# Patient Record
Sex: MALE | Race: BLACK OR AFRICAN AMERICAN | Employment: OTHER | URBAN - METROPOLITAN AREA
[De-identification: names, ages, dates, MRNs, and addresses within clinical notes are randomized per-mention and may not be internally consistent; named-entity substitution may affect disease eponyms.]

---

## 2017-05-25 ENCOUNTER — HOSPITAL ENCOUNTER (OUTPATIENT)
Age: 72
Discharge: HOME | End: 2017-05-25
Payer: MEDICARE

## 2017-05-25 LAB
ABSOLUTE EOS #: 0.1 K/UL (ref 0–0.4)
ABSOLUTE LYMPH #: 1.1 K/UL (ref 1–4.8)
ABSOLUTE MONO #: 0.6 K/UL (ref 0.1–1.2)
ALT SERPL-CCNC: 13 U/L (ref 5–41)
AST SERPL-CCNC: 14 U/L
BASOPHILS # BLD: 1 %
BASOPHILS ABSOLUTE: 0 K/UL (ref 0–0.2)
CHOLESTEROL/HDL RATIO: 2.9
CHOLESTEROL: 148 MG/DL
DIFFERENTIAL TYPE: ABNORMAL
EOSINOPHILS RELATIVE PERCENT: 4 %
HCT VFR BLD CALC: 33 % (ref 41–53)
HDLC SERPL-MCNC: 51 MG/DL
HEMOGLOBIN: 11.1 G/DL (ref 13.5–17.5)
LDL CHOLESTEROL: 74 MG/DL (ref 0–130)
LYMPHOCYTES # BLD: 27 %
MCH RBC QN AUTO: 27.4 PG (ref 26–34)
MCHC RBC AUTO-ENTMCNC: 33.7 G/DL (ref 31–37)
MCV RBC AUTO: 81.3 FL (ref 80–100)
MONOCYTES # BLD: 16 %
PDW BLD-RTO: 15.1 % (ref 12.5–15.4)
PLATELET # BLD: 167 K/UL (ref 140–450)
PLATELET ESTIMATE: ABNORMAL
PMV BLD AUTO: 9.7 FL (ref 6–12)
RBC # BLD: 4.06 M/UL (ref 4.5–5.9)
RBC # BLD: ABNORMAL 10*6/UL
SEG NEUTROPHILS: 52 %
SEGMENTED NEUTROPHILS ABSOLUTE COUNT: 2.2 K/UL (ref 1.8–7.7)
TRIGL SERPL-MCNC: 117 MG/DL
VLDLC SERPL CALC-MCNC: NORMAL MG/DL (ref 1–30)
WBC # BLD: 4.1 K/UL (ref 3.5–11)
WBC # BLD: ABNORMAL 10*3/UL

## 2017-05-25 PROCEDURE — 84460 ALANINE AMINO (ALT) (SGPT): CPT

## 2017-05-25 PROCEDURE — 84450 TRANSFERASE (AST) (SGOT): CPT

## 2017-05-25 PROCEDURE — 85025 COMPLETE CBC W/AUTO DIFF WBC: CPT

## 2017-05-25 PROCEDURE — 80061 LIPID PANEL: CPT

## 2017-11-16 ENCOUNTER — HOSPITAL ENCOUNTER (OUTPATIENT)
Age: 72
Discharge: HOME | End: 2017-11-16
Payer: MEDICARE

## 2017-11-16 LAB
ANION GAP SERPL CALCULATED.3IONS-SCNC: 13 MMOL/L (ref 9–17)
BUN BLDV-MCNC: 9 MG/DL (ref 8–23)
BUN/CREAT BLD: ABNORMAL (ref 9–20)
CALCIUM SERPL-MCNC: 9.6 MG/DL (ref 8.6–10.4)
CHLORIDE BLD-SCNC: 100 MMOL/L (ref 98–107)
CO2: 26 MMOL/L (ref 20–31)
CREAT SERPL-MCNC: 0.88 MG/DL (ref 0.7–1.2)
GFR AFRICAN AMERICAN: >60 ML/MIN
GFR NON-AFRICAN AMERICAN: >60 ML/MIN
GFR SERPL CREATININE-BSD FRML MDRD: ABNORMAL ML/MIN/{1.73_M2}
GFR SERPL CREATININE-BSD FRML MDRD: ABNORMAL ML/MIN/{1.73_M2}
GLUCOSE BLD-MCNC: 108 MG/DL (ref 70–99)
HCT VFR BLD CALC: 36.9 % (ref 40.7–50.3)
HEMOGLOBIN: 11.4 G/DL (ref 13–17)
INR BLD: 1
MCH RBC QN AUTO: 26.6 PG (ref 25.2–33.5)
MCHC RBC AUTO-ENTMCNC: 30.9 G/DL (ref 28.4–34.8)
MCV RBC AUTO: 86.2 FL (ref 82.6–102.9)
PDW BLD-RTO: 14.1 % (ref 11.8–14.4)
PLATELET # BLD: 174 K/UL (ref 138–453)
PMV BLD AUTO: 11.4 FL (ref 8.1–13.5)
POTASSIUM SERPL-SCNC: 4.6 MMOL/L (ref 3.7–5.3)
PROTHROMBIN TIME: 10.4 SEC (ref 9.4–12.6)
RBC # BLD: 4.28 M/UL (ref 4.21–5.77)
SODIUM BLD-SCNC: 139 MMOL/L (ref 135–144)
WBC # BLD: 4.7 K/UL (ref 3.5–11.3)

## 2017-11-16 PROCEDURE — 80048 BASIC METABOLIC PNL TOTAL CA: CPT

## 2017-11-16 PROCEDURE — 85027 COMPLETE CBC AUTOMATED: CPT

## 2017-11-16 PROCEDURE — 85610 PROTHROMBIN TIME: CPT

## 2017-11-16 PROCEDURE — 36415 COLL VENOUS BLD VENIPUNCTURE: CPT

## 2017-11-17 NOTE — H&P
Cardiovascular Office Follow up Note/ H and P. Date of office visit: 11/10/2017    Patient name: Jose De Jesus Treviño YOB: 1945    Reason for visit: Jose De Jesus Treviño is here for follow up of coronary artery disease, cardiomyopathy, hypertension and hyperlipidemia. Interval History:    Since his last visit, he has done well without any new cardiac complaints. He denies any lower extremity edema, orthopnea or paroxysmal nocturnal dyspnea. Coronary artery disease - history of coronary artery bypass graft surgery and stent placement. No new anginal episodes. Cardiomyopathy - Status post St. Bhanu automatic implantable cardiac defibrillator placement. His device check today showed his device to be in FRANCOIS. Hypertension - Blood pressure is controlled. Denies any headaches or dizziness. Tolerating medications. Hyperlipidemia - controlled on Atorvastatin. No new myalgia reported. Aortic valve disorder - Mild to moderate AI without any new heart failure symptoms. Overweight- No significant change in weight since last visit. Past medical history, Past surgical history, Family History, Social History, Medications, Allergies, Previous labs and Problem list were reviewed and updated as needed. Medications:     Current Outpatient Prescriptions:    amLODIPine (NORVASC) 5 mg tablet, Take 5 mg by mouth daily. , Disp: , Rfl:    aspirin 81 mg, Take 81 mg by mouth daily. , Disp: , Rfl:    atorvastatin (LIPITOR) 20 mg tablet, Take 20 mg by mouth daily. , Disp: , Rfl:    carvedilol (COREG) 25 mg tablet, Take 25 mg by mouth 2 (two) times a day with meals. , Disp: , Rfl:    chromium picolinate 200 mcg tablet, Take 200 mcg by mouth daily. , Disp: , Rfl:    clopidogrel (PLAVIX) 75 mg tablet, Take 75 mg by mouth daily. , Disp: , Rfl:    cranberry extract 200 mg capsule, Take by mouth daily. , Disp: , Rfl:    lisinopril (PRINIVIL,ZESTRIL) 40 mg tablet, Take 40 mg by mouth daily. , Disp: , Rfl:    (11/16/2017)   Narrative   Results from [facility].               Thank you very much for allowing us to participate in the care of this patient. Please call us with any questions.     Annette Hall MD

## 2017-11-20 RX ORDER — LISINOPRIL 40 MG/1
40 TABLET ORAL DAILY
COMMUNITY

## 2017-11-20 RX ORDER — MULTIVITAMIN/IRON/FOLIC ACID 18MG-0.4MG
250 TABLET ORAL DAILY
COMMUNITY

## 2017-11-20 RX ORDER — AMLODIPINE BESYLATE 5 MG/1
5 TABLET ORAL DAILY
COMMUNITY

## 2017-11-20 RX ORDER — CARVEDILOL 25 MG/1
25 TABLET ORAL 2 TIMES DAILY WITH MEALS
COMMUNITY

## 2017-11-20 RX ORDER — MULTIVIT-MIN/FOLIC/VIT K/LYCOP 400-300MCG
200 TABLET ORAL DAILY
COMMUNITY

## 2017-11-20 RX ORDER — MAGNESIUM GLUCONATE 30 MG(550)
595 TABLET ORAL DAILY
COMMUNITY

## 2017-11-20 RX ORDER — ATORVASTATIN CALCIUM 20 MG/1
20 TABLET, FILM COATED ORAL DAILY
COMMUNITY

## 2017-11-20 RX ORDER — CHROMIUM PICOLINATE 200 MCG
200 TABLET ORAL DAILY
COMMUNITY

## 2017-11-20 RX ORDER — CLOPIDOGREL BISULFATE 75 MG/1
75 TABLET ORAL DAILY
Status: ON HOLD | COMMUNITY
End: 2018-10-20

## 2017-11-20 RX ORDER — OMEPRAZOLE 20 MG/1
20 CAPSULE, DELAYED RELEASE ORAL DAILY
COMMUNITY

## 2017-11-21 ENCOUNTER — HOSPITAL ENCOUNTER (OUTPATIENT)
Dept: CARDIAC CATH/INVASIVE PROCEDURES | Age: 72
Discharge: HOME | End: 2017-11-21
Attending: INTERNAL MEDICINE | Admitting: INTERNAL MEDICINE
Payer: MEDICARE

## 2017-11-21 VITALS
HEART RATE: 71 BPM | HEIGHT: 68 IN | TEMPERATURE: 97 F | DIASTOLIC BLOOD PRESSURE: 55 MMHG | OXYGEN SATURATION: 98 % | WEIGHT: 180.5 LBS | SYSTOLIC BLOOD PRESSURE: 106 MMHG | BODY MASS INDEX: 27.35 KG/M2 | RESPIRATION RATE: 16 BRPM

## 2017-11-21 PROCEDURE — 7100000001 HC PACU RECOVERY - ADDTL 15 MIN

## 2017-11-21 PROCEDURE — C1777 LEAD, AICD, ENDO SINGLE COIL: HCPCS

## 2017-11-21 PROCEDURE — 7100000011 HC PHASE II RECOVERY - ADDTL 15 MIN

## 2017-11-21 PROCEDURE — 33262 RMVL& REPLC PULSE GEN 1 LEAD: CPT | Performed by: INTERNAL MEDICINE

## 2017-11-21 PROCEDURE — 2580000003 HC RX 258: Performed by: INTERNAL MEDICINE

## 2017-11-21 PROCEDURE — C1722 AICD, SINGLE CHAMBER: HCPCS

## 2017-11-21 PROCEDURE — 7100000000 HC PACU RECOVERY - FIRST 15 MIN

## 2017-11-21 PROCEDURE — 2500000003 HC RX 250 WO HCPCS

## 2017-11-21 PROCEDURE — 7100000010 HC PHASE II RECOVERY - FIRST 15 MIN

## 2017-11-21 PROCEDURE — 6360000002 HC RX W HCPCS

## 2017-11-21 RX ORDER — SODIUM CHLORIDE 9 MG/ML
INJECTION, SOLUTION INTRAVENOUS CONTINUOUS
Status: DISCONTINUED | OUTPATIENT
Start: 2017-11-21 | End: 2017-11-22 | Stop reason: HOSPADM

## 2017-11-21 RX ORDER — SODIUM CHLORIDE 0.9 % (FLUSH) 0.9 %
10 SYRINGE (ML) INJECTION EVERY 12 HOURS SCHEDULED
Status: DISCONTINUED | OUTPATIENT
Start: 2017-11-21 | End: 2017-11-22 | Stop reason: HOSPADM

## 2017-11-21 RX ORDER — ACETAMINOPHEN 325 MG/1
650 TABLET ORAL EVERY 4 HOURS PRN
Status: DISCONTINUED | OUTPATIENT
Start: 2017-11-21 | End: 2017-11-22 | Stop reason: HOSPADM

## 2017-11-21 RX ORDER — SODIUM CHLORIDE 0.9 % (FLUSH) 0.9 %
10 SYRINGE (ML) INJECTION PRN
Status: DISCONTINUED | OUTPATIENT
Start: 2017-11-21 | End: 2017-11-22 | Stop reason: HOSPADM

## 2017-11-21 RX ORDER — ONDANSETRON 2 MG/ML
4 INJECTION INTRAMUSCULAR; INTRAVENOUS EVERY 6 HOURS PRN
Status: DISCONTINUED | OUTPATIENT
Start: 2017-11-21 | End: 2017-11-22 | Stop reason: HOSPADM

## 2017-11-21 RX ADMIN — SODIUM CHLORIDE: 9 INJECTION, SOLUTION INTRAVENOUS at 07:25

## 2017-11-21 ASSESSMENT — PAIN SCALES - GENERAL
PAINLEVEL_OUTOF10: 3
PAINLEVEL_OUTOF10: 0
PAINLEVEL_OUTOF10: 3

## 2017-11-21 ASSESSMENT — PAIN DESCRIPTION - PAIN TYPE
TYPE: SURGICAL PAIN

## 2017-11-21 ASSESSMENT — PAIN DESCRIPTION - LOCATION
LOCATION: CHEST;INCISION

## 2017-11-21 ASSESSMENT — PAIN - FUNCTIONAL ASSESSMENT: PAIN_FUNCTIONAL_ASSESSMENT: 0-10

## 2018-10-11 ENCOUNTER — HOSPITAL ENCOUNTER (OUTPATIENT)
Age: 73
Discharge: HOME | End: 2018-10-11
Payer: MEDICARE

## 2018-10-11 LAB
ANION GAP SERPL CALCULATED.3IONS-SCNC: 8 MMOL/L (ref 9–17)
BUN BLDV-MCNC: 8 MG/DL (ref 8–23)
BUN/CREAT BLD: ABNORMAL (ref 9–20)
CALCIUM SERPL-MCNC: 9.4 MG/DL (ref 8.6–10.4)
CHLORIDE BLD-SCNC: 101 MMOL/L (ref 98–107)
CO2: 29 MMOL/L (ref 20–31)
CREAT SERPL-MCNC: 0.97 MG/DL (ref 0.7–1.2)
GFR AFRICAN AMERICAN: >60 ML/MIN
GFR NON-AFRICAN AMERICAN: >60 ML/MIN
GFR SERPL CREATININE-BSD FRML MDRD: ABNORMAL ML/MIN/{1.73_M2}
GFR SERPL CREATININE-BSD FRML MDRD: ABNORMAL ML/MIN/{1.73_M2}
GLUCOSE BLD-MCNC: 103 MG/DL (ref 70–99)
HCT VFR BLD CALC: 41.7 % (ref 40.7–50.3)
HEMOGLOBIN: 13.4 G/DL (ref 13–17)
MCH RBC QN AUTO: 27.4 PG (ref 25.2–33.5)
MCHC RBC AUTO-ENTMCNC: 32.1 G/DL (ref 28.4–34.8)
MCV RBC AUTO: 85.3 FL (ref 82.6–102.9)
NRBC AUTOMATED: 0 PER 100 WBC
PDW BLD-RTO: 14 % (ref 11.8–14.4)
PLATELET # BLD: 161 K/UL (ref 138–453)
PMV BLD AUTO: 11.1 FL (ref 8.1–13.5)
POTASSIUM SERPL-SCNC: 4.7 MMOL/L (ref 3.7–5.3)
RBC # BLD: 4.89 M/UL (ref 4.21–5.77)
SODIUM BLD-SCNC: 138 MMOL/L (ref 135–144)
WBC # BLD: 4.7 K/UL (ref 3.5–11.3)

## 2018-10-11 PROCEDURE — 36415 COLL VENOUS BLD VENIPUNCTURE: CPT

## 2018-10-11 PROCEDURE — 80048 BASIC METABOLIC PNL TOTAL CA: CPT

## 2018-10-11 PROCEDURE — 85027 COMPLETE CBC AUTOMATED: CPT

## 2018-10-19 ENCOUNTER — HOSPITAL ENCOUNTER (INPATIENT)
Dept: CARDIAC CATH/INVASIVE PROCEDURES | Age: 73
LOS: 1 days | Discharge: HOME | DRG: 265 | End: 2018-10-20
Attending: INTERNAL MEDICINE | Admitting: INTERNAL MEDICINE
Payer: MEDICARE

## 2018-10-19 ENCOUNTER — ANESTHESIA (OUTPATIENT)
Dept: CARDIAC CATH/INVASIVE PROCEDURES | Age: 73
End: 2018-10-19

## 2018-10-19 ENCOUNTER — ANESTHESIA EVENT (OUTPATIENT)
Dept: CARDIAC CATH/INVASIVE PROCEDURES | Age: 73
End: 2018-10-19

## 2018-10-19 VITALS — OXYGEN SATURATION: 100 % | SYSTOLIC BLOOD PRESSURE: 102 MMHG | DIASTOLIC BLOOD PRESSURE: 58 MMHG | TEMPERATURE: 97.3 F

## 2018-10-19 DIAGNOSIS — T82.110A FAILURE OF PACEMAKER LEAD, INITIAL ENCOUNTER: ICD-10-CM

## 2018-10-19 LAB
ABO/RH: NORMAL
ANTIBODY SCREEN: NEGATIVE
ARM BAND NUMBER: NORMAL
EXPIRATION DATE: NORMAL

## 2018-10-19 PROCEDURE — 0JWT0PZ REVISION OF CARDIAC RHYTHM RELATED DEVICE IN TRUNK SUBCUTANEOUS TISSUE AND FASCIA, OPEN APPROACH: ICD-10-PCS | Performed by: INTERNAL MEDICINE

## 2018-10-19 PROCEDURE — 86901 BLOOD TYPING SEROLOGIC RH(D): CPT

## 2018-10-19 PROCEDURE — 6370000000 HC RX 637 (ALT 250 FOR IP): Performed by: INTERNAL MEDICINE

## 2018-10-19 PROCEDURE — 33216 INSERT 1 ELECTRODE PM-DEFIB: CPT | Performed by: INTERNAL MEDICINE

## 2018-10-19 PROCEDURE — 02HK3KZ INSERTION OF DEFIBRILLATOR LEAD INTO RIGHT VENTRICLE, PERCUTANEOUS APPROACH: ICD-10-PCS | Performed by: INTERNAL MEDICINE

## 2018-10-19 PROCEDURE — 2720000010 HC SURG SUPPLY STERILE

## 2018-10-19 PROCEDURE — 6360000002 HC RX W HCPCS: Performed by: NURSE ANESTHETIST, CERTIFIED REGISTERED

## 2018-10-19 PROCEDURE — G0378 HOSPITAL OBSERVATION PER HR: HCPCS

## 2018-10-19 PROCEDURE — 6360000002 HC RX W HCPCS

## 2018-10-19 PROCEDURE — 2700000000 HC OXYGEN THERAPY PER DAY

## 2018-10-19 PROCEDURE — 2580000003 HC RX 258: Performed by: INTERNAL MEDICINE

## 2018-10-19 PROCEDURE — 94762 N-INVAS EAR/PLS OXIMTRY CONT: CPT

## 2018-10-19 PROCEDURE — 2060000000 HC ICU INTERMEDIATE R&B

## 2018-10-19 PROCEDURE — 2580000003 HC RX 258: Performed by: NURSE ANESTHETIST, CERTIFIED REGISTERED

## 2018-10-19 PROCEDURE — 2500000003 HC RX 250 WO HCPCS: Performed by: INTERNAL MEDICINE

## 2018-10-19 PROCEDURE — 3700000001 HC ADD 15 MINUTES (ANESTHESIA)

## 2018-10-19 PROCEDURE — C1894 INTRO/SHEATH, NON-LASER: HCPCS

## 2018-10-19 PROCEDURE — 86850 RBC ANTIBODY SCREEN: CPT

## 2018-10-19 PROCEDURE — 2709999900 HC NON-CHARGEABLE SUPPLY

## 2018-10-19 PROCEDURE — 6360000002 HC RX W HCPCS: Performed by: INTERNAL MEDICINE

## 2018-10-19 PROCEDURE — 3700000000 HC ANESTHESIA ATTENDED CARE

## 2018-10-19 PROCEDURE — 33244 REMOVE ELCTRD TRANSVENOUSLY: CPT | Performed by: INTERNAL MEDICINE

## 2018-10-19 PROCEDURE — 6360000002 HC RX W HCPCS: Performed by: ANESTHESIOLOGY

## 2018-10-19 PROCEDURE — 02PA3MZ REMOVAL OF CARDIAC LEAD FROM HEART, PERCUTANEOUS APPROACH: ICD-10-PCS | Performed by: INTERNAL MEDICINE

## 2018-10-19 PROCEDURE — 2500000003 HC RX 250 WO HCPCS: Performed by: NURSE ANESTHETIST, CERTIFIED REGISTERED

## 2018-10-19 PROCEDURE — 86900 BLOOD TYPING SEROLOGIC ABO: CPT

## 2018-10-19 PROCEDURE — 6360000004 HC RX CONTRAST MEDICATION

## 2018-10-19 PROCEDURE — C1769 GUIDE WIRE: HCPCS

## 2018-10-19 RX ORDER — SODIUM CHLORIDE, SODIUM LACTATE, POTASSIUM CHLORIDE, CALCIUM CHLORIDE 600; 310; 30; 20 MG/100ML; MG/100ML; MG/100ML; MG/100ML
INJECTION, SOLUTION INTRAVENOUS CONTINUOUS PRN
Status: DISCONTINUED | OUTPATIENT
Start: 2018-10-19 | End: 2018-10-19 | Stop reason: SDUPTHER

## 2018-10-19 RX ORDER — PROPOFOL 10 MG/ML
INJECTION, EMULSION INTRAVENOUS PRN
Status: DISCONTINUED | OUTPATIENT
Start: 2018-10-19 | End: 2018-10-19 | Stop reason: SDUPTHER

## 2018-10-19 RX ORDER — LISINOPRIL 20 MG/1
40 TABLET ORAL DAILY
Status: DISCONTINUED | OUTPATIENT
Start: 2018-10-19 | End: 2018-10-20 | Stop reason: HOSPADM

## 2018-10-19 RX ORDER — GLYCOPYRROLATE 1 MG/5 ML
SYRINGE (ML) INTRAVENOUS PRN
Status: DISCONTINUED | OUTPATIENT
Start: 2018-10-19 | End: 2018-10-19 | Stop reason: SDUPTHER

## 2018-10-19 RX ORDER — LABETALOL HYDROCHLORIDE 5 MG/ML
5 INJECTION, SOLUTION INTRAVENOUS EVERY 6 HOURS PRN
Status: DISCONTINUED | OUTPATIENT
Start: 2018-10-19 | End: 2018-10-20 | Stop reason: HOSPADM

## 2018-10-19 RX ORDER — ACETAMINOPHEN 325 MG/1
650 TABLET ORAL EVERY 4 HOURS PRN
Status: DISCONTINUED | OUTPATIENT
Start: 2018-10-19 | End: 2018-10-20 | Stop reason: HOSPADM

## 2018-10-19 RX ORDER — CEFAZOLIN SODIUM 1 G/50ML
1 INJECTION, SOLUTION INTRAVENOUS EVERY 8 HOURS
Status: DISCONTINUED | OUTPATIENT
Start: 2018-10-19 | End: 2018-10-20 | Stop reason: HOSPADM

## 2018-10-19 RX ORDER — ONDANSETRON 2 MG/ML
4 INJECTION INTRAMUSCULAR; INTRAVENOUS EVERY 6 HOURS PRN
Status: DISCONTINUED | OUTPATIENT
Start: 2018-10-19 | End: 2018-10-20 | Stop reason: HOSPADM

## 2018-10-19 RX ORDER — SODIUM CHLORIDE 0.9 % (FLUSH) 0.9 %
10 SYRINGE (ML) INJECTION EVERY 12 HOURS SCHEDULED
Status: DISCONTINUED | OUTPATIENT
Start: 2018-10-19 | End: 2018-10-20 | Stop reason: HOSPADM

## 2018-10-19 RX ORDER — AMLODIPINE BESYLATE 5 MG/1
5 TABLET ORAL DAILY
Status: DISCONTINUED | OUTPATIENT
Start: 2018-10-19 | End: 2018-10-20 | Stop reason: HOSPADM

## 2018-10-19 RX ORDER — LABETALOL HYDROCHLORIDE 5 MG/ML
15 INJECTION, SOLUTION INTRAVENOUS ONCE
Status: COMPLETED | OUTPATIENT
Start: 2018-10-19 | End: 2018-10-19

## 2018-10-19 RX ORDER — ONDANSETRON 2 MG/ML
INJECTION INTRAMUSCULAR; INTRAVENOUS PRN
Status: DISCONTINUED | OUTPATIENT
Start: 2018-10-19 | End: 2018-10-19 | Stop reason: SDUPTHER

## 2018-10-19 RX ORDER — SODIUM CHLORIDE 9 MG/ML
INJECTION, SOLUTION INTRAVENOUS CONTINUOUS
Status: DISCONTINUED | OUTPATIENT
Start: 2018-10-19 | End: 2018-10-20 | Stop reason: HOSPADM

## 2018-10-19 RX ORDER — LIDOCAINE HYDROCHLORIDE 10 MG/ML
INJECTION, SOLUTION EPIDURAL; INFILTRATION; INTRACAUDAL; PERINEURAL PRN
Status: DISCONTINUED | OUTPATIENT
Start: 2018-10-19 | End: 2018-10-19 | Stop reason: SDUPTHER

## 2018-10-19 RX ORDER — FENTANYL CITRATE 50 UG/ML
INJECTION, SOLUTION INTRAMUSCULAR; INTRAVENOUS PRN
Status: DISCONTINUED | OUTPATIENT
Start: 2018-10-19 | End: 2018-10-19 | Stop reason: SDUPTHER

## 2018-10-19 RX ORDER — ROCURONIUM BROMIDE 10 MG/ML
INJECTION, SOLUTION INTRAVENOUS PRN
Status: DISCONTINUED | OUTPATIENT
Start: 2018-10-19 | End: 2018-10-19 | Stop reason: SDUPTHER

## 2018-10-19 RX ORDER — CARVEDILOL 25 MG/1
25 TABLET ORAL 2 TIMES DAILY WITH MEALS
Status: DISCONTINUED | OUTPATIENT
Start: 2018-10-19 | End: 2018-10-20 | Stop reason: HOSPADM

## 2018-10-19 RX ORDER — DEXAMETHASONE SODIUM PHOSPHATE 10 MG/ML
INJECTION INTRAMUSCULAR; INTRAVENOUS PRN
Status: DISCONTINUED | OUTPATIENT
Start: 2018-10-19 | End: 2018-10-19 | Stop reason: SDUPTHER

## 2018-10-19 RX ORDER — CEFAZOLIN SODIUM 1 G/50ML
1 INJECTION, SOLUTION INTRAVENOUS ONCE
Status: COMPLETED | OUTPATIENT
Start: 2018-10-19 | End: 2018-10-19

## 2018-10-19 RX ORDER — SODIUM CHLORIDE 0.9 % (FLUSH) 0.9 %
10 SYRINGE (ML) INJECTION PRN
Status: DISCONTINUED | OUTPATIENT
Start: 2018-10-19 | End: 2018-10-20 | Stop reason: HOSPADM

## 2018-10-19 RX ORDER — FENTANYL CITRATE 50 UG/ML
25 INJECTION, SOLUTION INTRAMUSCULAR; INTRAVENOUS EVERY 5 MIN PRN
Status: DISCONTINUED | OUTPATIENT
Start: 2018-10-19 | End: 2018-10-20 | Stop reason: HOSPADM

## 2018-10-19 RX ORDER — ASPIRIN 81 MG/1
81 TABLET ORAL DAILY
Status: DISCONTINUED | OUTPATIENT
Start: 2018-10-19 | End: 2018-10-20 | Stop reason: HOSPADM

## 2018-10-19 RX ORDER — MEPERIDINE HYDROCHLORIDE 50 MG/ML
12.5 INJECTION INTRAMUSCULAR; INTRAVENOUS; SUBCUTANEOUS EVERY 5 MIN PRN
Status: DISCONTINUED | OUTPATIENT
Start: 2018-10-19 | End: 2018-10-20 | Stop reason: HOSPADM

## 2018-10-19 RX ADMIN — LISINOPRIL 40 MG: 20 TABLET ORAL at 15:58

## 2018-10-19 RX ADMIN — Medication 0.2 MG: at 09:30

## 2018-10-19 RX ADMIN — FENTANYL CITRATE 50 MCG: 50 INJECTION INTRAMUSCULAR; INTRAVENOUS at 09:06

## 2018-10-19 RX ADMIN — PROPOFOL 30 MG: 10 INJECTION, EMULSION INTRAVENOUS at 08:54

## 2018-10-19 RX ADMIN — SODIUM CHLORIDE: 9 INJECTION, SOLUTION INTRAVENOUS at 07:02

## 2018-10-19 RX ADMIN — ROCURONIUM BROMIDE 20 MG: 10 INJECTION INTRAVENOUS at 09:02

## 2018-10-19 RX ADMIN — SODIUM CHLORIDE, POTASSIUM CHLORIDE, SODIUM LACTATE AND CALCIUM CHLORIDE: 600; 310; 30; 20 INJECTION, SOLUTION INTRAVENOUS at 08:06

## 2018-10-19 RX ADMIN — LABETALOL HYDROCHLORIDE 15 MG: 5 INJECTION, SOLUTION INTRAVENOUS at 15:59

## 2018-10-19 RX ADMIN — CARVEDILOL 25 MG: 25 TABLET, FILM COATED ORAL at 15:58

## 2018-10-19 RX ADMIN — Medication 10 ML: at 19:40

## 2018-10-19 RX ADMIN — ONDANSETRON 4 MG: 2 INJECTION, SOLUTION INTRAMUSCULAR; INTRAVENOUS at 11:50

## 2018-10-19 RX ADMIN — CEFAZOLIN SODIUM 2 G: 1 INJECTION, SOLUTION INTRAVENOUS at 08:09

## 2018-10-19 RX ADMIN — Medication 0.2 MG: at 10:31

## 2018-10-19 RX ADMIN — ROCURONIUM BROMIDE 50 MG: 10 INJECTION INTRAVENOUS at 07:52

## 2018-10-19 RX ADMIN — FENTANYL CITRATE 25 MCG: 50 INJECTION, SOLUTION INTRAMUSCULAR; INTRAVENOUS at 12:50

## 2018-10-19 RX ADMIN — FENTANYL CITRATE 100 MCG: 50 INJECTION INTRAMUSCULAR; INTRAVENOUS at 07:52

## 2018-10-19 RX ADMIN — ASPIRIN 81 MG: 81 TABLET ORAL at 15:58

## 2018-10-19 RX ADMIN — DEXAMETHASONE SODIUM PHOSPHATE 10 MG: 10 INJECTION INTRAMUSCULAR; INTRAVENOUS at 08:10

## 2018-10-19 RX ADMIN — NEOSTIGMINE METHYLSULFATE 3 MG: 1 INJECTION, SOLUTION INTRAMUSCULAR; INTRAVENOUS; SUBCUTANEOUS at 11:46

## 2018-10-19 RX ADMIN — PHENYLEPHRINE HYDROCHLORIDE 100 MCG: 10 INJECTION INTRAVENOUS at 10:29

## 2018-10-19 RX ADMIN — PROPOFOL 150 MG: 10 INJECTION, EMULSION INTRAVENOUS at 07:52

## 2018-10-19 RX ADMIN — FENTANYL CITRATE 50 MCG: 50 INJECTION INTRAMUSCULAR; INTRAVENOUS at 08:54

## 2018-10-19 RX ADMIN — Medication 0.4 MG: at 11:46

## 2018-10-19 RX ADMIN — LIDOCAINE HYDROCHLORIDE 50 MG: 10 INJECTION, SOLUTION EPIDURAL; INFILTRATION; INTRACAUDAL at 07:52

## 2018-10-19 RX ADMIN — CEFAZOLIN SODIUM 1 G: 1 INJECTION, SOLUTION INTRAVENOUS at 19:40

## 2018-10-19 ASSESSMENT — PAIN SCALES - GENERAL
PAINLEVEL_OUTOF10: 4
PAINLEVEL_OUTOF10: 4
PAINLEVEL_OUTOF10: 8

## 2018-10-19 ASSESSMENT — PAIN DESCRIPTION - PAIN TYPE: TYPE: ACUTE PAIN

## 2018-10-19 ASSESSMENT — PAIN DESCRIPTION - LOCATION: LOCATION: INCISION

## 2018-10-19 NOTE — CARE COORDINATION
Case Management Initial Discharge Plan  Fortino Chavez,             Met with:patient to discuss discharge plans. Information verified: address, contacts, phone number, , insurance Yes  PCP: Sameera Ibanez  Date of last visit:     Insurance Provider: Abram Owusu    Discharge Planning    Living Arrangements:  Alone   Support Systems:  Family Members, Friends/Neighbors    Home has 2 stories  5 stairs to climb to get into front door, 1 flight stairs to climb to reach second floor  Location of bedroom/bathroom in home second    Patient able to perform ADL's:Independent    Current Services (outpatient & in home) none  DME equipment: none  DME provider: none    Pharmacy: [facility] [address] Medications:  No  Does patient want to participate in local refill/ meds to beds program?  No    Potential Assistance Needed:  N/A    Patient agreeable to home care: No  Avery of choice provided:  no    Prior SNF/Rehab Placement and Facility: no  Agreeable to SNF/Rehab: No  Avery of choice provided: no   Evaluation: no    Expected Discharge date:     Patient expects to be discharged to:  home  Follow Up Appointment: Best Day/ Time:      Transportation provider: friend  Transportation arrangements needed for discharge: No    Readmission Risk              Risk of Unplanned Readmission:        7               Does patient have a readmission risk score greater than 14?: No  If yes, follow-up appointment must be made within 7 days of discharge.      Discharge Plan: home independently          Electronically signed by Walter Peterson RN on 10/19/18 at 2:48 PM

## 2018-10-20 ENCOUNTER — APPOINTMENT (OUTPATIENT)
Dept: GENERAL RADIOLOGY | Age: 73
DRG: 265 | End: 2018-10-20
Attending: INTERNAL MEDICINE
Payer: MEDICARE

## 2018-10-20 VITALS
BODY MASS INDEX: 26.2 KG/M2 | RESPIRATION RATE: 17 BRPM | DIASTOLIC BLOOD PRESSURE: 58 MMHG | WEIGHT: 172.84 LBS | SYSTOLIC BLOOD PRESSURE: 119 MMHG | TEMPERATURE: 98.4 F | HEIGHT: 68 IN | HEART RATE: 87 BPM | OXYGEN SATURATION: 97 %

## 2018-10-20 PROBLEM — T82.198A MALFUNCTION OF IMPLANTABLE DEFIBRILLATOR VENTRICULAR (ICD) LEAD: Status: ACTIVE | Noted: 2018-10-20

## 2018-10-20 PROCEDURE — 6360000002 HC RX W HCPCS: Performed by: INTERNAL MEDICINE

## 2018-10-20 PROCEDURE — G0378 HOSPITAL OBSERVATION PER HR: HCPCS

## 2018-10-20 PROCEDURE — 6370000000 HC RX 637 (ALT 250 FOR IP): Performed by: INTERNAL MEDICINE

## 2018-10-20 PROCEDURE — 71046 X-RAY EXAM CHEST 2 VIEWS: CPT

## 2018-10-20 RX ORDER — CEPHALEXIN 500 MG/1
500 CAPSULE ORAL 3 TIMES DAILY
Qty: 21 CAPSULE | Refills: 0 | Status: SHIPPED | OUTPATIENT
Start: 2018-10-20 | End: 2018-10-27

## 2018-10-20 RX ORDER — CLOPIDOGREL BISULFATE 75 MG/1
75 TABLET ORAL DAILY
Qty: 30 TABLET | Refills: 3 | Status: SHIPPED | OUTPATIENT
Start: 2018-10-27

## 2018-10-20 RX ADMIN — LISINOPRIL 40 MG: 20 TABLET ORAL at 08:32

## 2018-10-20 RX ADMIN — CEFAZOLIN SODIUM 1 G: 1 INJECTION, SOLUTION INTRAVENOUS at 03:39

## 2018-10-20 RX ADMIN — ACETAMINOPHEN 650 MG: 325 TABLET ORAL at 03:50

## 2018-10-20 RX ADMIN — CEFAZOLIN SODIUM 1 G: 1 INJECTION, SOLUTION INTRAVENOUS at 11:33

## 2018-10-20 RX ADMIN — CARVEDILOL 25 MG: 25 TABLET, FILM COATED ORAL at 08:32

## 2018-10-20 RX ADMIN — ASPIRIN 81 MG: 81 TABLET ORAL at 08:32

## 2018-10-20 RX ADMIN — AMLODIPINE BESYLATE 5 MG: 5 TABLET ORAL at 08:32

## 2018-10-20 ASSESSMENT — PAIN SCALES - GENERAL
PAINLEVEL_OUTOF10: 3
PAINLEVEL_OUTOF10: 5

## 2018-10-20 ASSESSMENT — PAIN DESCRIPTION - ORIENTATION: ORIENTATION: LEFT

## 2018-10-20 ASSESSMENT — PAIN DESCRIPTION - ONSET: ONSET: GRADUAL

## 2018-10-20 ASSESSMENT — PAIN DESCRIPTION - PROGRESSION: CLINICAL_PROGRESSION: NOT CHANGED

## 2018-10-20 ASSESSMENT — PAIN DESCRIPTION - PAIN TYPE: TYPE: SURGICAL PAIN

## 2018-10-20 ASSESSMENT — PAIN DESCRIPTION - DESCRIPTORS: DESCRIPTORS: SORE

## 2018-10-20 ASSESSMENT — PAIN DESCRIPTION - LOCATION: LOCATION: INCISION

## 2018-10-20 ASSESSMENT — PAIN DESCRIPTION - FREQUENCY: FREQUENCY: INTERMITTENT

## 2018-10-20 NOTE — PLAN OF CARE
Problem: Infection:  Goal: Will remain free from infection  Will remain free from infection  Outcome: Ongoing  Patient remains afebrile, surgical sites noted and no signs of infection.     Problem: Safety:  Goal: Free from accidental physical injury  Free from accidental physical injury  Outcome: Ongoing  Bed lock and in lowest position, side rails up x 2, room free from clutter, call light within reach, patient alert and oriented x 4 and remains free from falls    Problem: Pain:  Goal: Patient's pain/discomfort is manageable  Patient's pain/discomfort is manageable  Outcome: Ongoing  Patient denies pain when ask    Problem: Skin Integrity:  Goal: Skin integrity will stabilize  Skin integrity will stabilize  Outcome: Ongoing      Problem: Discharge Planning:  Goal: Patients continuum of care needs are met  Patients continuum of care needs are met  Outcome: Ongoing

## 2018-10-20 NOTE — DISCHARGE INSTR - ACTIVITY
Wash incisions with mild soap and water. Do not rub incisions. Do not put any lotions, ointments or powders on incisions. Do not soak incisions. No lifting more than 10 pounds. Continue to use incentive spirometer hourly for 2 weeks. Do not drive until physician follow up. Weigh yourself daily at the same time, and if you have a weight gain of 3 pounds in 24 hours or 5 pounds in one week call your physician. Call physician if temperature greater than 100.5 for more than 3 days. Call physician for any increase in redness or drainage of incisions.

## 2018-10-21 NOTE — DISCHARGE SUMMARY
[facility] [place]                              DISCHARGE SUMMARY    PATIENT NAME: Oscar Pascual                    :         1945  MED REC NO:   [id number]                             ROOM:       1006  ACCOUNT NO:   [de-identified]                           ADMIT DATE:  10/19/2018  PROVIDER:     Loreto Angela                  [facility] DATE:    ADMISSION DIAGNOSIS:  ICD lead malfunction. The patient had St. Bhanu  Riata lead and a nonfunctional atrial lead. DISCHARGE DIAGNOSES:  Successful laser lead extraction of right  ventricular defibrillating lead, successful laser lead extraction of  the right atrial pacing lead and successful implantation of a new  right ventricular defibrillating lead. HOSPITAL COURSE:  The patient is a 40-year-old male, who was admitted  electively for laser lead extraction of nonfunctioning ICD leads. The  patient had a St. Bhanu Riata lead with electrical interference and a  previously implanted right atrial lead, which was nonfunctional.  Note  that his initial implant occurred in . He had defibrillator which  was implanted in  for ischemic dilated cardiomyopathy. The  patient was referred for laser lead extraction of the 2 leads as  mentioned above and implantation of a new right ventricular  defibrillating lead. The patient underwent successful laser lead extraction of 2 leads on  10/19/2018. Please refer to my operative note for details. His postoperative course was uneventful. He was hemodynamically  stable. On the day of exam, the patient was hemodynamically stable. His vascular access sites were intact. Chest x-ray demonstrated no  evidence of pneumothorax. ICD check demonstrated excellent pacing and  sensing thresholds. He was discharged to home on 10/20/2018 with instructions to follow up  on an outpatient basis.   Please refer to discharge

## 2019-02-19 ENCOUNTER — HOSPITAL ENCOUNTER (OUTPATIENT)
Age: 74
Discharge: HOME | End: 2019-02-19
Payer: MEDICARE

## 2019-02-19 LAB
ALT SERPL-CCNC: 11 U/L (ref 5–41)
AST SERPL-CCNC: 13 U/L
CHOLESTEROL, FASTING: 116 MG/DL
CHOLESTEROL/HDL RATIO: 2.8
HDLC SERPL-MCNC: 42 MG/DL
LDL CHOLESTEROL: 59 MG/DL (ref 0–130)
TRIGLYCERIDE, FASTING: 73 MG/DL
VLDLC SERPL CALC-MCNC: NORMAL MG/DL (ref 1–30)

## 2019-02-19 PROCEDURE — 84450 TRANSFERASE (AST) (SGOT): CPT

## 2019-02-19 PROCEDURE — 80061 LIPID PANEL: CPT

## 2019-02-19 PROCEDURE — 84460 ALANINE AMINO (ALT) (SGPT): CPT

## 2019-02-19 PROCEDURE — 36415 COLL VENOUS BLD VENIPUNCTURE: CPT

## 2021-09-23 ENCOUNTER — HOSPITAL ENCOUNTER (OUTPATIENT)
Age: 76
Discharge: HOME | End: 2021-09-23
Payer: MEDICARE

## 2021-09-23 LAB
ANION GAP SERPL CALCULATED.3IONS-SCNC: 12 MMOL/L (ref 9–17)
BUN BLDV-MCNC: 9 MG/DL (ref 8–23)
BUN/CREAT BLD: ABNORMAL (ref 9–20)
CALCIUM SERPL-MCNC: 9.5 MG/DL (ref 8.6–10.4)
CHLORIDE BLD-SCNC: 105 MMOL/L (ref 98–107)
CO2: 25 MMOL/L (ref 20–31)
CREAT SERPL-MCNC: 0.97 MG/DL (ref 0.7–1.2)
GFR AFRICAN AMERICAN: >60 ML/MIN
GFR NON-AFRICAN AMERICAN: >60 ML/MIN
GFR SERPL CREATININE-BSD FRML MDRD: ABNORMAL ML/MIN/{1.73_M2}
GFR SERPL CREATININE-BSD FRML MDRD: ABNORMAL ML/MIN/{1.73_M2}
GLUCOSE BLD-MCNC: 116 MG/DL (ref 70–99)
POTASSIUM SERPL-SCNC: 4.3 MMOL/L (ref 3.7–5.3)
SODIUM BLD-SCNC: 142 MMOL/L (ref 135–144)

## 2021-09-23 PROCEDURE — 83036 HEMOGLOBIN GLYCOSYLATED A1C: CPT

## 2021-09-23 PROCEDURE — 80048 BASIC METABOLIC PNL TOTAL CA: CPT

## 2021-09-23 PROCEDURE — 36415 COLL VENOUS BLD VENIPUNCTURE: CPT

## 2021-09-24 LAB
ESTIMATED AVERAGE GLUCOSE: 128 MG/DL
HBA1C MFR BLD: 6.1 % (ref 4–6)

## 2022-11-01 ENCOUNTER — HOSPITAL ENCOUNTER (OUTPATIENT)
Dept: PHYSICAL THERAPY | Age: 77
Setting detail: THERAPIES SERIES
Discharge: HOME | End: 2022-11-01
Payer: MEDICARE

## 2022-11-01 PROCEDURE — 97162 PT EVAL MOD COMPLEX 30 MIN: CPT

## 2022-11-01 PROCEDURE — 97110 THERAPEUTIC EXERCISES: CPT

## 2022-11-01 NOTE — CONSULTS
[x] [facility]) [facility] &  Therapy  [address]  [phone]  F: [phone]         Physical Therapy Lower Extremity Evaluation    Date:  2022  Patient: John Mo  : 1945  MRN: [id number]  Physician: [facility]      Insurance: Sabi Nova (medical necessity)  Medical Diagnosis: Primary OA R Knee M17.11    Rehab Codes: M25.561, M25.661, R26.89, R26.81, M79.661, M25.561, M62.551, M62.551  Onset date: 10/06/2022  Next Dr's appt.: unknown     Subjective:   HPI/CC: Pt reports last month OOT getting out of limousine, was awkward, put a lot of pressure on his knees. Next day R knee gave out, went in toward other knee, w/pain. Pt went to Urgent care, Xrays negative, was issued brace, crutches, and pain medicine. Pt saw ortho, Xrays showed arthritis under knee cap, medial knee, reports Dr said soft tissue/meniscus is damaged, was given prescription for tylenol. Pain is decreasing, every day feels a little better. Biggest problem is descending steps, has to use cane. Pain increases w/steps, up to 10/10. Pt also complains of pain shooting down ant calf to ankle. Pt does not notice improvement w/hot water from bathing, has not used ice since went to ED. Pt [facility] to [facility] in wheelchair, wearing brace R knee, at eval, Velcro w/patella cutout and metal stays/hinges, reports brace helps when walking. Pt has been using cane at home for amb, unable to use crutches, unable to amb long distances.         PMHx: [] Unremarkable [] Diabetes [x] HTN  [] Pacemaker   [x] MI/Heart Problems-CABG  2017, Defib Replaced 10/19/2018-no motrin   [] Cancer [] Arthritis   [x] Other:chronic R shoulder pain, glaucoma, Hypertensive retinopathy , cataract, GERD    Past Medical History:   Diagnosis Date    Aortic valve disorder     CAD (coronary artery disease)     Cardiomyopathy ([address]     Hyperlipidemia     Hypertension     ICD (implantable cardioverter-defibrillator) battery depletion      Past Surgical History:   Procedure Laterality Date    CARDIAC DEFIBRILLATOR PLACEMENT  11/21/2017    replacement    CARDIAC SURGERY      CORONARY ANGIOPLASTY WITH STENT PLACEMENT      PROSTATECTOMY                   [x] Refer to full medical chart  In EPIC       Comorbidities:   [x] Obesity [] Dialysis  [] N/A   [] Asthma/COPD [] Dementia [] Other:   [] Stroke [] Sleep apnea [] Other:   [] Vascular disease [] Rheumatic disease [] Other:     Tests: [x] X-Ray:Per [facility] note moderate DJD greater at patellofemoral joint. [] MRI:  [] Other:    Medications: [x] Refer to full medical record [] None [] Other:  Allergies:      [x] Refer to full medical record [x] None [] Other:    Function:  Hand Dominance  [x] Right  [] Left  Patient lives with: Alone    In what type of home []  One story   [x] Two story-bed 2nd floor, bath 1st floor     [] Split level   Number of stairs to enter 5   With handrail on the []  Right to enter   [] Left to enter   Bathroom has a []  Tub only  [x] Tub/shower combo   [] Walk in shower    []  Grab bars   Washing machine is on []  Main level   [] Second level   [x] Takes to family members home   Employer NA   Job Status []  Normal duty   [] Light duty   [] Off due to condition    [x]  Retired   [] Not employed   [] Disability  [] Other:  []  Return to work:    Work activities/duties Reading, studying languages       ADL/IADL Previous level of function Current level of function Who currently assists the patient with task   Bathing  [x] Independent  [] Assist [] Independent  [x] Assist Modified    Dress/grooming [x] Independent  [] Assist [] Independent  [x] Assist Modified    Transfer/mobility [x] Independent  [] Assist [x] Independent  [] Assist    Feeding [x] Independent  [] Assist [x] Independent  [] Assist    Toileting [x] Independent  [] Assist [x] Independent  [] Assist    Driving [x] Independent  [] Assist [] Independent  [x] Assist Housekeeping [x] Independent  [] Assist [] Independent  [x] Assist    Grocery shop/meal prep [x] Independent  [] Assist [] Independent  [x] Assist Motorized cart for shopping     Gait Prior level of function Current level of function    [x] Independent  [] Assist [x] Independent  [] Assist   Device: [x] Independent [] Independent    [] Straight Cane [] Quad cane [x] Straight Cane [] Quad cane    [] Standard walker [] Rolling walker   [] 4 wheeled walker [] Standard walker [] Rolling walker   [] 4 wheeled walker    [] Wheelchair [] Wheelchair     Pain:  [x] Yes  [] No Location: R knee Pain Rating: (0-10 scale) 6/10 sitting, 8/10 walking  Pain altered Tx:  [] Yes  [x] No  Action:    Symptoms:  [x] Improving [] Worsening [] Same    Sleep: [] OK    [x] Disturbed-painful if turns     Objective:    ROM  ° A/P STRENGTH TESTS (+/-) Left Right Not Tested    Left Right Left Right Ant.  Drawer   []   Hip Flex   4+ 3-p Post. Drawer   []   Ext     Lachmans   []   ER     Valgus Stress  + []   IR     Varus Stress  - []   ABD     Sánchezs   []   ADD     Apleys Comp.   []   Knee Flex WNL  90°,* 124°p 4+ 4p Apleys Dist.   []   Ext WNL  10°p 4+ 3+p Hip Scouring   []   Ankle DF   5 4+p STEVEs   []   PF     Piriformis   []   INV     Deriks   []   EVER     Talor Tilt   []        Pat-Fem Grind   []   p=pain  *90° at first, stops due to pain, after resting at 90° able to flex to 124° w/increasing pain as bends further     OBSERVATION No Deficit Deficit Not Tested Comments   Posture       Forward Head [] [x] []    Rounded Shoulders [] [x] []    Slumped Sitting [] [x] []    Palpation [] [x] [] Medial jt line very tender    Sensation [x] [] [] Intact light touch, denies numbness, tingling    Edema [] [] [x]    Neurological [] [] [x]    Patellar Mobility [] [x] [] Limited all planes, inf mob painful    Patellar Orientation [x] [] []    Gait [] [x] [] Analysis:guarded, antalgic, decreased knee ROM, decreased WB R knee           Functional Test: LEFS  Score: 78% functionally impaired     Comments: Pt very talkative, asking large amount questions, requiring increased time for taking history and performing evaluation. Assessment:  Patient would benefit from skilled physical therapy services in order to: decrease pain R Knee, increase ROM R Knee, increase strength R LE, improve walking and steps, and decrease risk of falls. Problem list, as detailed above:   [x] ? Pain     [x] ? ROM    [x] ? Strength    [x] ? Function: LEFS 78% loss of LE function  [] ? Balance  [] Edema  [] Postural Deviations  [x] Gait Deviations  [] Other     STG: (to be met in 10 treatments)  ? Pain: R knee 5/10 w/household walking, 7/10 at worst   ? ROM: R knee ext 6° to improve tolerance to laying down   ? Strength: R knee ext 4-/5, flex 4+/5, R hip 3+/5, to improve walking   ? Function: LEFS 64% loss of LE function  Pt amb household distances w/out device   Patient to be independent with home exercise program as demonstrated by performance with correct form without cues. Demonstrate Knowledge of fall prevention   LTG: (to be met in 18 treatments)  ? Pain: R knee 3/10 w/community amb, 5/10 at worst   ? ROM: R knee ext 4°, flex WNL w/out increased pain to improve tolerance to laying down and steps  ? Strength: R knee 4+/5, R hip 4-/5, to improve steps   ? Function: LEFS 50% loss of LE function  Pt safely amb community distances indep w/out device  Pt ascend/descend 1 flight steps, step-over step w/pain 3/10 or less                   Patient goals:\"eliminate problem\"    Rehab Potential:  [x] Good  [] Fair  [] Poor   Suggested Professional Referral:  [x] No  [] Yes:  Barriers to Goal Achievement:  [x] No  [] Yes:  Domestic Concerns:  [x] No  [] Yes:    Pt. Education:  [x] Plans/Goals, Risks/Benefits discussed  [x] Home exercise program    Method of Education: [x] Verbal  [x] Demo  [x] Written  Comprehension of Education:  [x] Verbalizes understanding.   [] Demonstrates understanding. [x] Needs Review. [] Demonstrates/verbalizes understanding of HEP/Ed previously given. HEP-Access Code: [email or web address]  URL: [email or web address]  Date: 11/01/2022  Prepared by: Manisha Hoff  Seated Heel Toe Raises - 2-3 x daily - 7 x weekly - 20 reps  Seated Heel Slide - 2-3 x daily - 7 x weekly - 20 reps  Seated March - 2-3 x daily - 7 x weekly - 20 reps  Seated Hip Adduction Isometrics with Ball - 2-3 x daily - 7 x weekly - 10 reps - 5 sec hold        Treatment Plan:  [x] Therapeutic Exercise   67278  [] Iontophoresis: 4 mg/mL Dexamethasone Sodium Phosphate  mAmin  70115   [x] Therapeutic Activity  38523 [x] Vasopneumatic cold with compression  94665    [x] Gait Training   29047 [x] Ultrasound   [id number]   [] Neuromuscular Re-education  38370 [] Electrical Stimulation Unattended  09886   [x] Manual Therapy  47337 [] Electrical Stimulation Attended  81239   [x] Instruction in HEP  [] Lumbar/Cervical Traction  16605   [] Aquatic Therapy   02224 [x] Cold/hotpack    [] Massage   59669      [] Dry Needling, 1 or 2 muscles  87814   [] Biofeedback, first 15 minutes   27395  [] Biofeedback, additional 15 minutes   69283 [x] Dry Needling, 3 or more muscles  89754     []  Medication allergies reviewed for use of    Dexamethasone Sodium Phosphate 4mg/ml     with iontophoresis treatments. Pt is not allergic. Frequency:  2 x/week for 18 visits        Todays Treatment:  Modalities:   Precautions: Internal Cardiac Defibrillator   Exercises:  Exercise Reps/ Time Weight/ Level Comments   Seated       Add sets  [id number]     Ankle pumps  15x     March  15x     LAQ 10x     Other: Educated Pt in ex for HEP, issued handouts.       Specific Instructions for next treatment: Progress knee ex per Pt tolerance, add SciFit, remaining mat ex/knee AROM ex, isometrics, trial HP or game ready       Evaluation Complexity:  History (Personal factors, comorbidities) [] 0 [] 1-2 [x] 3+   Exam (limitations, restrictions) [] 1-2 [] 3 [x] 4+   Clinical presentation (progression) [] Stable [x] Evolving  [] Unstable   Decision Making [] Low [x] Moderate [] High    [] Low Complexity [x] Moderate Complexity [] High Complexity       Treatment Charges: Mins Units   [x] Evaluation       []  Low       [x]  Moderate       []  High 50 1   []  Modalities     [x]  Ther Exercise 11 1   []  Manual Therapy     []  Ther Activities     []  Aquatics     []  Vasocompression     []  Other       TOTAL TREATMENT TIME: 61 min    Time KO:6278   Time YGQ:8996    Electronically signed by: Kristin Parra PT          Physician Signature:________________________________Date:__________________  By signing above or cosigning this note, I have reviewed this plan of care and certify a need for medically necessary rehabilitation services.      *PLEASE SIGN ABOVE AND FAX BACK ALL PAGES*

## 2022-11-02 NOTE — FLOWSHEET NOTE
[place] Fall Risk Assessment    Patient Name:  Milagros Otero  : 1945    Risk Factor Scale  Score   History of Falls [] Yes  [x] No 25  0 0   Secondary Diagnosis [] Yes  [x] No 15  0 0   Ambulatory Aid [] Furniture  [x] Crutches/cane/walker  [] None/bedrest/wheelchair/nurse 30  15  0 15   IV/Heparin Lock [] Yes  [x] No 20  0 0   Gait/Transferring [] Impaired  [x] Weak  [] Normal/bedrest/immobile 20  10  0 10   Mental Status [] Forgets limitations  [x] Oriented to own ability 15  0 0      Total: 25     Based on the Assessment score: check the appropriate box.     []  No intervention needed   Low =   Score of 0-24    [x]  Use standard prevention interventions Moderate =  Score of 24-44   [x] Give patient handout and discuss fall prevention strategies   [x] Establish goal of education for patient/family RE: fall prevention strategies    []  Use high risk prevention interventions High = Score of 45 and higher   [] Give patient handout and discuss fall prevention strategies   [] Establish goal of education for patient/family Re: fall prevention strategies   [] Discuss lifeline / other resources    Electronically signed by:   Becky Morton PT  Date: 2022

## 2022-11-03 ENCOUNTER — HOSPITAL ENCOUNTER (OUTPATIENT)
Dept: PHYSICAL THERAPY | Age: 77
Setting detail: THERAPIES SERIES
Discharge: HOME | End: 2022-11-03
Payer: MEDICARE

## 2022-11-03 PROCEDURE — 97110 THERAPEUTIC EXERCISES: CPT

## 2022-11-03 NOTE — FLOWSHEET NOTE
[x] [facility]) - [facility] &  Therapy  [address]  [phone]  F: [phone] [] [address]  [address]   [address]  P: [phone]  F: [phone] [] [place] &  Therapy  [address]  P: [phone]  F: [phone] [] [address]  P: [phone]  F: [phone] [] [address]  [facility]   [address]   [place]: [phone]  F: [phone]      Physical Therapy Daily Treatment Note    Date:  11/3/2022  Patient Name:  Kelly Thomas    :  1945  MRN: [id number]  Physician: Celestine Gonsalez [facility]                                    Insurance: Mukund Nieves (medical necessity)  Medical Diagnosis: Primary OA R Knee M17.11                   Rehab Codes: M25.561, M25.661, R26.89, R26.81, M79.661, M25.561, M62.551, M62.551  Onset date: 10/06/2022                     Next 's appt.: unknown   Visit# / total visits: ; Progress note for Medicare patient due at visit 10     Cancels/No Shows: 0/0    Subjective:    Pain:  [x] Yes  [] No Location: R knee Pain Rating: (0-10 scale) 6/10  Pain altered Tx:  [] No  [] Yes  Action:  Comments: Pt reported no change since last visit, stating the knee continues to have pain and he feels slightly unstable.      Objective:  Modalities:   Precautions: Internal Cardiac Defibrillator   Exercises:  Exercise Reps/ Time Weight/ Level Comments   Scifit/Nustep 4 min Level 2          Standing      Heel raises 10x     Marching 10x ea     3 way hip 10x ea  OKC   Step taps 10x 8 in                            Seated          Add sets  [id number]       Ankle pumps  15x       March  15x       LAQ 10x       Hip abduction 10x lime    Hamstring curls 15x lime          Supine      SAQ 10x     clamshells 15x lime    SLR 10x AA                                  Other: Educated Pt in ex for HEP, issued handouts. Specific Instructions for next treatment: Progress knee ex per Pt tolerance, remaining mat ex/knee AROM ex, isometrics, trial HP or game ready       Treatment Charges: Mins Units   [x]  Modalities: HP 10 0   [x]  Ther Exercise 32 2   []  Manual Therapy     []  Ther Activities     []  Aquatics     []  Vasocompression     []  Other     Total Treatment time 32 2       Assessment: [x] Progressing toward goals. Pt initiated session on nustep to increase LE endurance and ROM. Added standing activities to progress LE strength. Added supine activities to improve ROM and hip strength with good tolerance. Pt noted SLR were the hardest for him to do. Pt concluded session with trial of HP to decrease pain symptoms. Updated HEP as listed below. [] No change. [] Other:  [x] Patient would continue to benefit from skilled physical therapy services in order to: decrease pain R Knee, increase ROM R Knee, increase strength R LE, improve walking and steps, and decrease risk of falls    STG/LTG    STG: (to be met in 10 treatments)  ? Pain: R knee 5/10 w/household walking, 7/10 at worst   ? ROM: R knee ext 6° to improve tolerance to laying down   ? Strength: R knee ext 4-/5, flex 4+/5, R hip 3+/5, to improve walking   ? Function: LEFS 64% loss of LE function  Pt amb household distances w/out device   Patient to be independent with home exercise program as demonstrated by performance with correct form without cues. Demonstrate Knowledge of fall prevention   LTG: (to be met in 18 treatments)  ? Pain: R knee 3/10 w/community amb, 5/10 at worst   ? ROM: R knee ext 4°, flex WNL w/out increased pain to improve tolerance to laying down and steps  ? Strength: R knee 4+/5, R hip 4-/5, to improve steps   ?  Function: LEFS 50% loss of LE function  Pt safely amb community distances indep w/out device  Pt ascend/descend 1 flight steps,

## 2022-11-08 ENCOUNTER — HOSPITAL ENCOUNTER (OUTPATIENT)
Dept: PHYSICAL THERAPY | Age: 77
Setting detail: THERAPIES SERIES
Discharge: HOME | End: 2022-11-08
Payer: MEDICARE

## 2022-11-08 PROCEDURE — 97116 GAIT TRAINING THERAPY: CPT

## 2022-11-08 PROCEDURE — 97110 THERAPEUTIC EXERCISES: CPT

## 2022-11-08 NOTE — FLOWSHEET NOTE
[x] [facility] [facility]  [address]  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 Today he is a little sore, knee is aching, as had to  line to vote this morning for 45 min.      Objective:  Modalities:   Precautions: Internal Cardiac Defibrillator   Exercises: R Knee  Exercise Reps/ Time Weight/ Level Comments   Scifit/Nustep 5 min Level 2 Progressed time , moved seat further back to decrease pain inf ant knee         Standing      Heel raises 15x  Progressed reps  15x ea  Progressed reps    HS curls  15x  Added    3 way hip 15x ea  OKC, Progressed reps    Step taps 15x 8 in Progressed reps                            Seated          Add sets  [id number]      Ankle pumps  15x      March  15x      LAQ 15x   R, L Progressed reps , add weight soon   Hip abduction 10x lime Progressed reps    Hamstring curls 15x lime R, L         Supine      SAQ 15x  Progressed reps    clamshells 15x lime    SLR 10x A/AA    Bridges  10x  Added    Quad sets  *  Add soon                      Amb w/s.cane  50 ft x2   adjusted cane height, Instructed in holding in L hand to improve gait pattern and MEREDITH    Other:            Treatment Charges: Mins Units   [x]  Modalities: HP 10 0   [x]  Ther Exercise 37 2   []  Manual Therapy     []  Ther Activities     []  Aquatics     []  Vasocompression     [x]  Other Gait  10 1   Total Treatment time 47 3       Assessment: [x] Progressing toward goals. Initiated HS curls and bridges to improve standing tolerance and bed mobility. Cont HP to decrease pain and tightness and improve circulation to aid healing. Pt reports decreased pain after HP, just dull. [] No change. [x] Other: Note Pt holding cane in R UE, cane too short for Pt. Adjusted Pt's cane height, educated Pt in holding in L UE to improve gait pattern and increase base of support. Pt very stiff amb w/s.cane   [x] Patient would continue to benefit from skilled physical therapy services in order to: decrease pain R Knee, increase ROM R Knee, increase strength R LE, improve walking and steps, and decrease risk of falls        STG: (to be met in 10 treatments)  ? Pain: R knee 5/10 w/household walking, 7/10 at worst   ? ROM: R knee ext 6° to improve tolerance to laying down   ? Strength: R knee ext 4-/5, flex 4+/5, R hip 3+/5, to improve walking   ? Function: LEFS 64% loss of LE function  Pt amb household distances w/out device   Patient to be independent with home exercise program as demonstrated by performance with correct form without cues. Demonstrate Knowledge of fall prevention   LTG: (to be met in 18 treatments)  ? Pain: R knee 3/10 w/community amb, 5/10 at worst   ? ROM: R knee ext 4°, flex WNL w/out increased pain to improve tolerance to laying down and steps  ? Strength: R knee 4+/5, R hip 4-/5, to improve steps   ? Function: LEFS 50% loss of LE function  Pt safely amb community distances indep w/out device  Pt ascend/descend 1 flight steps, step-over step w/pain 3/10 or less                    Patient goals:\"eliminate problem\"       Pt.  Education:  [x] Yes  [] No  [x] Reviewed Prior HEP/Ed  Method of Education: [x] Verbal  [x] Demo  [x] Written  Comprehension of Education:  [x] Verbalizes understanding. [x] Demonstrates understanding. [] Needs review. [x] Demonstrates/verbalizes HEP/Ed previously given. 11/3 updated HEP-Access Code: [email or web address]  URL: [email or web address]  Date: 11/03/2022  Prepared by: [id number] [facility]  Seated Heel Toe Raises - 2-3 x daily - 7 x weekly - 20 reps  Seated Heel Slide - 2-3 x daily - 7 x weekly - 20 reps  Seated March - 2-3 x daily - 7 x weekly - 20 reps  Seated Hip Adduction Isometrics with Ball - 2-3 x daily - 7 x weekly - 10 reps - 5 sec hold  Standing Heel Raise - 2-3 x daily - 7 x weekly - 2-3 sets - 10 reps  Standing Marching - 2-3 x daily - 7 x weekly - 2-3 sets - 10 reps  Standing Marching - 2-3 x daily - 7 x weekly - 2-3 sets - 10 reps  Standing Hip Extension - 2-3 x daily - 7 x weekly - 2-3 sets - 10 reps  Standing Hip Abduction - 2-3 x daily - 7 x weekly - 2-3 sets - 10 reps  Seated Hip Abduction - 2-3 x daily - 7 x weekly - 2-3 sets - 10 reps  Supine Knee Extension Strengthening - 2-3 x daily - 7 x weekly - 2-3 sets - 10 reps  Hooklying Clamshell with Resistance - 2-3 x daily - 7 x weekly - 2-3 sets - 10 reps      Plan: [x] Continue current frequency toward long and short term goals.   2 x/week for 18 visits  [x] Specific Instructions for subsequent treatments: Progress knee ex per Pt tolerance, remaining mat ex/knee AROM ex, add quad sets, review amb w/cane      Time In:9627            Time Out: 6291    Electronically signed by:  Claudine Parker PT

## 2022-11-10 ENCOUNTER — HOSPITAL ENCOUNTER (OUTPATIENT)
Dept: PHYSICAL THERAPY | Age: 77
Setting detail: THERAPIES SERIES
Discharge: HOME | End: 2022-11-10
Payer: MEDICARE

## 2022-11-10 PROCEDURE — 97116 GAIT TRAINING THERAPY: CPT

## 2022-11-10 PROCEDURE — 97110 THERAPEUTIC EXERCISES: CPT

## 2022-11-10 PROCEDURE — 97140 MANUAL THERAPY 1/> REGIONS: CPT

## 2022-11-10 NOTE — FLOWSHEET NOTE
[x] [facility]  [address]  [phone]  F: [phone]         Physical Therapy Daily Treatment Note    Date:  11/10/2022  Patient Name:  Heather Gagnon \"WFGNES\"   :  1945  MRN: [id number]  Physician: Miriam Lee [facility]                                    Insurance: Shade Adam (medical necessity)  Medical Diagnosis: Primary OA R Knee M17.11                   Rehab Codes: M25.561, M25.661, R26.89, R26.81, M79.661, M25.561, M62.551, M62.551  Onset date: 10/06/2022                     Next Dr's appt.: unknown   Visit# / total visits: ; Progress note for Medicare patient due at visit 10     Cancels/No Shows: 0/0    Subjective:    Pain:  [x] Yes  [] No Location: R knee Pain Rating: (0-10 scale) 5/10  Pain altered Tx:  [x] No  [] Yes  Action:  Comments: Pt reported that he continues to improve stating that his pain has decreased. Pt did however note some minor soreness after last treatment.      Objective:  Modalities: HP to R knee, 5 min  Precautions: Internal Cardiac Defibrillator   Exercises: R Knee  Exercise Reps/ Time Weight/ Level Comments   Scifit/Nustep 5 min Level 2          Standing      Heel raises 2x10     Marching 2x10 ea     HS curls  20x     3 way hip 20x ea  OKC,    Step taps 20x 8 in                            Seated          Add sets  [id number]      Ankle pumps  15x      March  15x      LAQ 15x ea 1.5 lbs  R, L    Hip abduction 15x blue    Hamstring curls 15x blue R, L         Supine      SAQ 15x     clamshells 15x lime    SLR 10x A/AA    Bridges  10x     Quad sets  10x 2-3\"                       Amb w/s.cane  50 ft x2   Instructed in holding in L hand to improve gait pattern and MEREDITH    Other:            Treatment Charges: Mins Units   [x]  Modalities: HP 5 0   [x]  Ther Exercise 38 2   []  Manual Therapy     []  Ther Activities     []  Aquatics     []  Vasocompression     [x]  Other Gait  10 1   Total Treatment time 48 3 Assessment: [x] Progressing toward goals. Progressed all standing activities to improve LE strength and endurance with pt noting minor pain increase in L knee. Added ankle weights and increased resistance to all able seated activities to progress quad strength. Added quad sets to improve knee extension. Pt completed gate training with good tolerance and improved mechanics. Pt concluded session with HP to decrease pain. [] No change. [] Other:  [x] Patient would continue to benefit from skilled physical therapy services in order to: decrease pain R Knee, increase ROM R Knee, increase strength R LE, improve walking and steps, and decrease risk of falls        STG: (to be met in 10 treatments)  ? Pain: R knee 5/10 w/household walking, 7/10 at worst   ? ROM: R knee ext 6° to improve tolerance to laying down   ? Strength: R knee ext 4-/5, flex 4+/5, R hip 3+/5, to improve walking   ? Function: LEFS 64% loss of LE function  Pt amb household distances w/out device   Patient to be independent with home exercise program as demonstrated by performance with correct form without cues. Demonstrate Knowledge of fall prevention   LTG: (to be met in 18 treatments)  ? Pain: R knee 3/10 w/community amb, 5/10 at worst   ? ROM: R knee ext 4°, flex WNL w/out increased pain to improve tolerance to laying down and steps  ? Strength: R knee 4+/5, R hip 4-/5, to improve steps   ? Function: LEFS 50% loss of LE function  Pt safely amb community distances indep w/out device  Pt ascend/descend 1 flight steps, step-over step w/pain 3/10 or less                    Patient goals:\"eliminate problem\"       Pt. Education:  [x] Yes  [] No  [x] Reviewed Prior HEP/Ed  Method of Education: [x] Verbal  [x] Demo  [x] Written  Comprehension of Education:  [x] Verbalizes understanding. [x] Demonstrates understanding. [] Needs review. [x] Demonstrates/verbalizes HEP/Ed previously given.     11/3 updated HEP-Access Code: [email or web address]  URL: [email or web address]  Date: 11/03/2022  Prepared by: [facility]  Seated Heel Toe Raises - 2-3 x daily - 7 x weekly - 20 reps  Seated Heel Slide - 2-3 x daily - 7 x weekly - 20 reps  Seated March - 2-3 x daily - 7 x weekly - 20 reps  Seated Hip Adduction Isometrics with Ball - 2-3 x daily - 7 x weekly - 10 reps - 5 sec hold  Standing Heel Raise - 2-3 x daily - 7 x weekly - 2-3 sets - 10 reps  Standing Marching - 2-3 x daily - 7 x weekly - 2-3 sets - 10 reps  Standing Marching - 2-3 x daily - 7 x weekly - 2-3 sets - 10 reps  Standing Hip Extension - 2-3 x daily - 7 x weekly - 2-3 sets - 10 reps  Standing Hip Abduction - 2-3 x daily - 7 x weekly - 2-3 sets - 10 reps  Seated Hip Abduction - 2-3 x daily - 7 x weekly - 2-3 sets - 10 reps  Supine Knee Extension Strengthening - 2-3 x daily - 7 x weekly - 2-3 sets - 10 reps  Hooklying Clamshell with Resistance - 2-3 x daily - 7 x weekly - 2-3 sets - 10 reps      Plan: [x] Continue current frequency toward long and short term goals.   2 x/week for 18 visits  [x] Specific Instructions for subsequent treatments: Progress knee ex per Pt tolerance, remaining mat ex/knee AROM ex, add quad sets, review amb w/cane      Time In:1402            Time Out: 9746    Electronically signed by:  Ruddy Meigs, PTA

## 2022-11-15 ENCOUNTER — HOSPITAL ENCOUNTER (OUTPATIENT)
Dept: PHYSICAL THERAPY | Age: 77
Setting detail: THERAPIES SERIES
Discharge: HOME | End: 2022-11-15
Payer: MEDICARE

## 2022-11-15 PROCEDURE — 97110 THERAPEUTIC EXERCISES: CPT

## 2022-11-15 PROCEDURE — 97116 GAIT TRAINING THERAPY: CPT

## 2022-11-15 NOTE — FLOWSHEET NOTE
[x] [facility]  [address]  [phone]  F: [phone]         Physical Therapy Daily Treatment Note    Date:  11/15/2022  Patient Name:  Zoya Boles \"LUPGZJ\"   :  1945  MRN: [id number]  Physician: Harvinder Ramirez [facility]                                    Insurance: Adelaida Milian (medical necessity)  Medical Diagnosis: Primary OA R Knee M17.11                   Rehab Codes: M25.561, M25.661, R26.89, R26.81, M79.661, M25.561, M62.551, M62.551  Onset date: 10/06/2022                     Next Dr's appt.: unknown   Visit# / total visits: ; Progress note for Medicare patient due at visit 10     Cancels/No Shows: 0/0    Subjective:    Pain:  [x] Yes  [] No Location: R knee Pain Rating: (0-10 scale) 5-6/10  Pain altered Tx:  [x] No  [] Yes  Action:  Comments: Pt questioning if he can get back driving. Pt reports knee is feeling a lot better, only has a lot of pain if moves suddenly.        Objective:  Modalities: HP to R knee, 5 min-Pt requests only 5 min due to gets too hot if on much longer  Precautions: Internal Cardiac Defibrillator   Exercises: R Knee  Exercise Reps/ Time Weight/ Level Comments   Scifit/Nustep 5 min Level 2          Standing      Heel raises 2x10     Marching 2x10 ea     HS curls  20x     3 way hip 20x ea  OKC, added CKC 11/15   Step taps 10x2 8 in OKC, added CKC 11/15                           Seated          Add sets  [id number]      Ankle pumps  15x     March  10x2      LAQ 15x ea 1.5 lbs  R, L    Hip abduction 15x blue    Hamstring curls 15x blue R, L         Supine      SAQ 15x 1.5 lbs  R, L, Added weight 11/15   clamshells 15x lime    SLR 10x A    Bridges  10x     Quad sets  10x 2-3\"                       Amb w/s.cane  50 ft x2  15ftx1  Pt demonstrates good knowledge of holding in L hand to improve gait pattern and MEREDITH    Other:            Treatment Charges: Mins Units   [x]  Modalities: HP 5 0   [x]  Ther Exercise 49 3   []  Manual Therapy     []  Ther Activities     []  Aquatics     []  Vasocompression     [x]  Other Gait  6 1   Total Treatment time 5 min 3       Assessment: [x] Progressing toward goals. Note Pt w/good DF, and DF w/inversion/eversion. Instructed Pt in practicing DF w/inversion/eversion and toe curls next few days to prepare for driving. Instructed Pt and family members to practice driving in empty parking lot first, and if ok then can began driving short distances and gradually increase distances. Educated Pt his knee may be more sore when begins driving longer distances. Initiated CKC 3-way hip and step taps to improve standing activities, walking, and steps. Pt requires 2 seated rest breaks during standing ex, performed seated ex during these rest breaks. Pt able to perform SAQ w/increased weight. Cont HP at end of Rx, Pt requests only do 5 min as gets too hot if has on longer. [] No change. [] Other:  [x] Patient would continue to benefit from skilled physical therapy services in order to: decrease pain R Knee, increase ROM R Knee, increase strength R LE, improve walking and steps, and decrease risk of falls        STG: (to be met in 10 treatments)  ? Pain: R knee 5/10 w/household walking, 7/10 at worst   ? ROM: R knee ext 6° to improve tolerance to laying down   ? Strength: R knee ext 4-/5, flex 4+/5, R hip 3+/5, to improve walking   ? Function: LEFS 64% loss of LE function  Pt amb household distances w/out device   Patient to be independent with home exercise program as demonstrated by performance with correct form without cues. Demonstrate Knowledge of fall prevention   LTG: (to be met in 18 treatments)  ? Pain: R knee 3/10 w/community amb, 5/10 at worst   ? ROM: R knee ext 4°, flex WNL w/out increased pain to improve tolerance to laying down and steps  ? Strength: R knee 4+/5, R hip 4-/5, to improve steps   ?  Function: LEFS 50% loss of LE function  Pt safely amb community distances indep w/out device  Pt ascend/descend 1 flight steps, step-over step w/pain 3/10 or less                    Patient goals:\"eliminate problem\"       Pt. Education:  [x] Yes  [] No  [x] Reviewed Prior HEP/Ed  Method of Education: [x] Verbal  [x] Demo  [x] Written  Comprehension of Education:  [x] Verbalizes understanding. [x] Demonstrates understanding. [] Needs review. [x] Demonstrates/verbalizes HEP/Ed previously given. 11/3 updated HEP-Access Code: [email or web address]  URL: [email or web address]  Date: 11/03/2022  Prepared by: [facility]  Seated Heel Toe Raises - 2-3 x daily - 7 x weekly - 20 reps  Seated Heel Slide - 2-3 x daily - 7 x weekly - 20 reps  Seated March - 2-3 x daily - 7 x weekly - 20 reps  Seated Hip Adduction Isometrics with Ball - 2-3 x daily - 7 x weekly - 10 reps - 5 sec hold  Standing Heel Raise - 2-3 x daily - 7 x weekly - 2-3 sets - 10 reps  Standing Marching - 2-3 x daily - 7 x weekly - 2-3 sets - 10 reps  Standing Marching - 2-3 x daily - 7 x weekly - 2-3 sets - 10 reps  Standing Hip Extension - 2-3 x daily - 7 x weekly - 2-3 sets - 10 reps  Standing Hip Abduction - 2-3 x daily - 7 x weekly - 2-3 sets - 10 reps  Seated Hip Abduction - 2-3 x daily - 7 x weekly - 2-3 sets - 10 reps  Supine Knee Extension Strengthening - 2-3 x daily - 7 x weekly - 2-3 sets - 10 reps  Hooklying Clamshell with Resistance - 2-3 x daily - 7 x weekly - 2-3 sets - 10 reps      Plan: [x] Continue current frequency toward long and short term goals.   2 x/week for 18 visits  [x] Specific Instructions for subsequent treatments: Progress knee ex per Pt tolerance, add remaining mat ex/knee AROM ex       Time In:5411            Time Out: 0640    Electronically signed by:  Joey Devries PT

## 2022-11-17 ENCOUNTER — HOSPITAL ENCOUNTER (OUTPATIENT)
Dept: PHYSICAL THERAPY | Age: 77
Setting detail: THERAPIES SERIES
Discharge: HOME | End: 2022-11-17
Payer: MEDICARE

## 2022-11-17 PROCEDURE — 97110 THERAPEUTIC EXERCISES: CPT

## 2022-11-17 NOTE — FLOWSHEET NOTE
[x] [address]  [address]  [phone]  F: [phone]         Physical Therapy Daily Treatment Note    Date:  2022  Patient Name:  Oral Sees \"EZOUCC\"   :  1945  MRN: [id number]  Physician: Jose Frias [facility]                                    Insurance: Adama Bradford (medical necessity)  Medical Diagnosis: Primary OA R Knee M17.11                   Rehab Codes: M25.561, M25.661, R26.89, R26.81, M79.661, M25.561, M62.551, M62.551  Onset date: 10/06/2022                     Next 's appt.: unknown   Visit# / total visits: ; Progress note for Medicare patient due at visit 10     Cancels/No Shows: 0/0    Subjective:    Pain:  [x] Yes  [] No Location: R knee Pain Rating: (0-10 scale) 6/10  Pain altered Tx:  [x] No  [] Yes  Action:  Comments: Pt reported some increase in pain and soreness in R knee due to decrease in temp and increase in activity yesterday.      Objective:  Modalities: HP to R knee, 5 min-Pt requests only 5 min due to gets too hot if on much longer  Precautions: Internal Cardiac Defibrillator   Exercises: R Knee  Exercise Reps/ Time Weight/ Level Comments   Scifit/Nustep 5 min Level 2          Standing      Heel raises 2x10     Marching 2x10 ea     HS curls  20x     3 way hip 20x ea  bilateral   Step taps 10x2 8 in bilateral   Step ups 10x ea 4 in bilateral                     Seated          Add sets  [id number]      Ankle pumps  20x     March  10x2      LAQ 20x ea 2 lbs  R, L    Hip abduction 15x blue    Hamstring curls 15x blue R, L         Supine      SAQ 20x 2 lbs     clamshells 15x blue    SLR 15x ea A    Bridges  10x     Quad sets  10x 2-3\"                       Amb w/s.cane  50 ft x2  15ftx1  Pt demonstrates good knowledge of holding in L hand to improve gait pattern and MEREDITH    Other:            Treatment Charges: Mins Units   [x]  Modalities: HP 5 0   [x]  Ther Exercise 40 3   []  Manual Therapy     [] Ther Activities     []  Aquatics     []  Vasocompression     [x]  Other Gait  8 0   Total Treatment time 50 min 3       Assessment: [x] Progressing toward goals. Pt initiated treatment with nustep tor progress LE endurance. Pt in need of one rest break during standing activities due to fatigue. Pt was able to progress reps and resistance to increase LE strength. Added step ups to improve pt tolerance to stairs. Pt concluded session with HP to R knee to decrease pain and soreness. Pt ambulated 50 ft x2 and 120ft x1 with good tolerance. [] No change. [] Other:  [x] Patient would continue to benefit from skilled physical therapy services in order to: decrease pain R Knee, increase ROM R Knee, increase strength R LE, improve walking and steps, and decrease risk of falls        STG: (to be met in 10 treatments)  ? Pain: R knee 5/10 w/household walking, 7/10 at worst   ? ROM: R knee ext 6° to improve tolerance to laying down   ? Strength: R knee ext 4-/5, flex 4+/5, R hip 3+/5, to improve walking   ? Function: LEFS 64% loss of LE function  Pt amb household distances w/out device   Patient to be independent with home exercise program as demonstrated by performance with correct form without cues. Demonstrate Knowledge of fall prevention   LTG: (to be met in 18 treatments)  ? Pain: R knee 3/10 w/community amb, 5/10 at worst   ? ROM: R knee ext 4°, flex WNL w/out increased pain to improve tolerance to laying down and steps  ? Strength: R knee 4+/5, R hip 4-/5, to improve steps   ? Function: LEFS 50% loss of LE function  Pt safely amb community distances indep w/out device  Pt ascend/descend 1 flight steps, step-over step w/pain 3/10 or less                    Patient goals:\"eliminate problem\"       Pt. Education:  [x] Yes  [] No  [x] Reviewed Prior HEP/Ed  Method of Education: [x] Verbal  [x] Demo  [x] Written  Comprehension of Education:  [x] Verbalizes understanding. [x] Demonstrates understanding.   [] Needs review. [x] Demonstrates/verbalizes HEP/Ed previously given. 11/3 updated HEP-Access Code: [email or web address]  URL: [email or web address]  Date: 11/03/2022  Prepared by: [id number] [facility]  Seated Heel Toe Raises - 2-3 x daily - 7 x weekly - 20 reps  Seated Heel Slide - 2-3 x daily - 7 x weekly - 20 reps  Seated March - 2-3 x daily - 7 x weekly - 20 reps  Seated Hip Adduction Isometrics with Ball - 2-3 x daily - 7 x weekly - 10 reps - 5 sec hold  Standing Heel Raise - 2-3 x daily - 7 x weekly - 2-3 sets - 10 reps  Standing Marching - 2-3 x daily - 7 x weekly - 2-3 sets - 10 reps  Standing Marching - 2-3 x daily - 7 x weekly - 2-3 sets - 10 reps  Standing Hip Extension - 2-3 x daily - 7 x weekly - 2-3 sets - 10 reps  Standing Hip Abduction - 2-3 x daily - 7 x weekly - 2-3 sets - 10 reps  Seated Hip Abduction - 2-3 x daily - 7 x weekly - 2-3 sets - 10 reps  Supine Knee Extension Strengthening - 2-3 x daily - 7 x weekly - 2-3 sets - 10 reps  Hooklying Clamshell with Resistance - 2-3 x daily - 7 x weekly - 2-3 sets - 10 reps      Plan: [x] Continue current frequency toward long and short term goals.   2 x/week for 18 visits  [x] Specific Instructions for subsequent treatments: Progress knee ex per Pt tolerance, increase step tolerance, when able begin ambulation without device      Time In:1400            Time Out: 1500    Electronically signed by:  Joanie Joy PTA

## 2022-11-21 ENCOUNTER — HOSPITAL ENCOUNTER (OUTPATIENT)
Dept: PHYSICAL THERAPY | Age: 77
Setting detail: THERAPIES SERIES
Discharge: HOME | End: 2022-11-21
Payer: MEDICARE

## 2022-11-21 PROCEDURE — 97116 GAIT TRAINING THERAPY: CPT

## 2022-11-21 PROCEDURE — 97110 THERAPEUTIC EXERCISES: CPT

## 2022-11-21 NOTE — FLOWSHEET NOTE
[x] [facility] [facility]  [address]  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 Stated he has been doing well at home and compliant with his HEP. Pt noted he is a little sore right this second as he walked from the entrance to the therapy clinic instead of taking a WC.      Objective:  Modalities: HP to R knee, 5 min-Pt requests only 5 min due to gets too hot if on much longer  Precautions: Internal Cardiac Defibrillator   Exercises: R Knee  Exercise Reps/ Time Weight/ Level Comments   Scifit/Nustep 5 min Level 2          Standing      Heel raises 2x10 1 lb    Marching 2x10 ea 1 lb    HS curls  20x 1 lb    3 way hip 20x ea 1 lb bilateral   Step taps  8 in bilateral   Step ups 10x ea 4 in bilateral                     Seated          Add sets  [id number]      Ankle pumps  20x     March  10x2      LAQ 20x ea 2 lbs  R, L    Hip abduction 20x blue    Hamstring curls 20x blue R, L         Supine      SAQ 20x 2 lbs     clamshells      SLR 15x ea A    Bridges  15x     Quad sets  15x 2-3\"                       Amb no cane  364 FT  Pt demonstrates good knowledge of holding in L hand to improve gait pattern and MEREDITH Other:            Treatment Charges: Mins Units   [x]  Modalities: HP 5 0   [x]  Ther Exercise 43 3   []  Manual Therapy     []  Ther Activities     []  Aquatics     []  Vasocompression     [x]  Other Gait  10 1   Total Treatment time 53 min 3       Assessment: [x] Progressing toward goals. Pt performed standing activities with good tolerance with progression of ankle weights. Added ankle weights to seated marches as well. Increased reps for multiple activities with no reported pain. Pt ambulated without an AD for 364 ft with moderate ambulation technique, minor cues for upright posture and heel to toe contact. Pt concluded session with HP to R knee to decrease pain and soreness. [] No change. [] Other:  [x] Patient would continue to benefit from skilled physical therapy services in order to: decrease pain R Knee, increase ROM R Knee, increase strength R LE, improve walking and steps, and decrease risk of falls        STG: (to be met in 10 treatments)  ? Pain: R knee 5/10 w/household walking, 7/10 at worst   ? ROM: R knee ext 6° to improve tolerance to laying down   ? Strength: R knee ext 4-/5, flex 4+/5, R hip 3+/5, to improve walking   ? Function: LEFS 64% loss of LE function  Pt amb household distances w/out device   Patient to be independent with home exercise program as demonstrated by performance with correct form without cues. Demonstrate Knowledge of fall prevention   LTG: (to be met in 18 treatments)  ? Pain: R knee 3/10 w/community amb, 5/10 at worst   ? ROM: R knee ext 4°, flex WNL w/out increased pain to improve tolerance to laying down and steps  ? Strength: R knee 4+/5, R hip 4-/5, to improve steps   ? Function: LEFS 50% loss of LE function  Pt safely amb community distances indep w/out device  Pt ascend/descend 1 flight steps, step-over step w/pain 3/10 or less                    Patient goals:\"eliminate problem\"       Pt.  Education:  [x] Yes  [] No  [x] Reviewed Prior HEP/Ed  Method of Education: [x] Verbal  [x] Demo  [x] Written  Comprehension of Education:  [x] Verbalizes understanding. [x] Demonstrates understanding. [] Needs review. [x] Demonstrates/verbalizes HEP/Ed previously given. 11/3 updated HEP-Access Code: [email or web address]  URL: [email or web address]  Date: 11/03/2022  Prepared by: [facility]  Seated Heel Toe Raises - 2-3 x daily - 7 x weekly - 20 reps  Seated Heel Slide - 2-3 x daily - 7 x weekly - 20 reps  Seated March - 2-3 x daily - 7 x weekly - 20 reps  Seated Hip Adduction Isometrics with Ball - 2-3 x daily - 7 x weekly - 10 reps - 5 sec hold  Standing Heel Raise - 2-3 x daily - 7 x weekly - 2-3 sets - 10 reps  Standing Marching - 2-3 x daily - 7 x weekly - 2-3 sets - 10 reps  Standing Marching - 2-3 x daily - 7 x weekly - 2-3 sets - 10 reps  Standing Hip Extension - 2-3 x daily - 7 x weekly - 2-3 sets - 10 reps  Standing Hip Abduction - 2-3 x daily - 7 x weekly - 2-3 sets - 10 reps  Seated Hip Abduction - 2-3 x daily - 7 x weekly - 2-3 sets - 10 reps  Supine Knee Extension Strengthening - 2-3 x daily - 7 x weekly - 2-3 sets - 10 reps  Hooklying Clamshell with Resistance - 2-3 x daily - 7 x weekly - 2-3 sets - 10 reps      Plan: [x] Continue current frequency toward long and short term goals.   2 x/week for 18 visits  [x] Specific Instructions for subsequent treatments: Progress knee ex per Pt tolerance, increase step tolerance, when able begin ambulation without device      Time In:1358            Time Out: 4086    Electronically signed by:  Yara Diego PTA

## 2022-11-23 ENCOUNTER — HOSPITAL ENCOUNTER (OUTPATIENT)
Dept: PHYSICAL THERAPY | Age: 77
Setting detail: THERAPIES SERIES
Discharge: HOME | End: 2022-11-23
Payer: MEDICARE

## 2022-11-23 PROCEDURE — 97116 GAIT TRAINING THERAPY: CPT

## 2022-11-23 PROCEDURE — 97110 THERAPEUTIC EXERCISES: CPT

## 2022-11-23 NOTE — FLOWSHEET NOTE
[x] [facility] [facility]  [address]  [phone]  F: [phone]         Physical Therapy Daily Treatment Note    Date:  2022  Patient Name:  Mary Alice Flanagan \"SDLISF\"   :  1945  MRN: [id number]  Physician: Kenya Hines [facility]                                    Insurance: Veronane Mariposa (medical necessity)  Medical Diagnosis: Primary OA R Knee M17.11                   Rehab Codes: M25.561, M25.661, R26.89, R26.81, M79.661, M25.561, M62.551, M62.551  Onset date: 10/06/2022                     Next Dr's appt.: unknown   Visit# / total visits: ; Progress note for Medicare patient due at visit 10     Cancels/No Shows: 0/0    Subjective:    Pain:  [x] Yes  [] No Location: R knee Pain Rating: (0-10 scale) 1-2/10  Pain altered Tx:  [x] No  [] Yes  Action:  Comments: Pt reported feeling tired and a little sore after last visit but noted the next day he felt much better. Stated that he feels better after each session.      Objective:  Modalities: HP to R knee, 5 min-Pt requests only 5 min due to gets too hot if on much longer  Precautions: Internal Cardiac Defibrillator   Exercises: R Knee  Exercise Reps/ Time Weight/ Level Comments   Scifit/Nustep 5 min Level 2          Standing      Heel raises 2x10 1 lb    Marching 2x10 ea 1 lb    HS curls  20x 1 lb    3 way hip 20x ea 1 lb bilateral   Step taps  8 in bilateral   Step ups 10x ea 4 in bilateral                     Seated          Add sets  [id number]      Ankle pumps  20x     March        LAQ 20x ea 2 lbs  R, L    Hip abduction 20x blue    Hamstring curls 20x blue R, L         Supine      SAQ 20x 2 lbs     clamshells      SLR 20x ea A    Bridges  20x     Quad sets  15x 2-3\"                       Amb no cane  400 FT     Other:            Treatment Charges: Mins Units   [x]  Modalities: HP 5 0   [x]  Ther Exercise 46 3   []  Manual Therapy     []  Ther Activities     []  Aquatics     []  Vasocompression [x]  Other Gait  10 1   Total Treatment time 56 min 3       Assessment: [x] Progressing toward goals. Pt with good tolerance to all activities, increased reps as tolerated with pt stating pain increase at end of sets. No additional activities added this date due to fatigue and increase in pain at end of reps. Pt ambulated without assistive device for 400 ft, pt stated slight increase in pain in last 50 ft due to fatigue. [] No change. [] Other:  [x] Patient would continue to benefit from skilled physical therapy services in order to: decrease pain R Knee, increase ROM R Knee, increase strength R LE, improve walking and steps, and decrease risk of falls        STG: (to be met in 10 treatments)  ? Pain: R knee 5/10 w/household walking, 7/10 at worst   ? ROM: R knee ext 6° to improve tolerance to laying down   ? Strength: R knee ext 4-/5, flex 4+/5, R hip 3+/5, to improve walking   ? Function: LEFS 64% loss of LE function  Pt amb household distances w/out device   Patient to be independent with home exercise program as demonstrated by performance with correct form without cues. Demonstrate Knowledge of fall prevention   LTG: (to be met in 18 treatments)  ? Pain: R knee 3/10 w/community amb, 5/10 at worst   ? ROM: R knee ext 4°, flex WNL w/out increased pain to improve tolerance to laying down and steps  ? Strength: R knee 4+/5, R hip 4-/5, to improve steps   ? Function: LEFS 50% loss of LE function  Pt safely amb community distances indep w/out device  Pt ascend/descend 1 flight steps, step-over step w/pain 3/10 or less                    Patient goals:\"eliminate problem\"       Pt. Education:  [x] Yes  [] No  [x] Reviewed Prior HEP/Ed  Method of Education: [x] Verbal  [x] Demo  [x] Written  Comprehension of Education:  [x] Verbalizes understanding. [x] Demonstrates understanding. [] Needs review. [x] Demonstrates/verbalizes HEP/Ed previously given.     11/3 updated HEP-Access Code: [email or web address]  URL: [email or web address]  Date: 11/03/2022  Prepared by: [facility]  Seated Heel Toe Raises - 2-3 x daily - 7 x weekly - 20 reps  Seated Heel Slide - 2-3 x daily - 7 x weekly - 20 reps  Seated March - 2-3 x daily - 7 x weekly - 20 reps  Seated Hip Adduction Isometrics with Ball - 2-3 x daily - 7 x weekly - 10 reps - 5 sec hold  Standing Heel Raise - 2-3 x daily - 7 x weekly - 2-3 sets - 10 reps  Standing Marching - 2-3 x daily - 7 x weekly - 2-3 sets - 10 reps  Standing Marching - 2-3 x daily - 7 x weekly - 2-3 sets - 10 reps  Standing Hip Extension - 2-3 x daily - 7 x weekly - 2-3 sets - 10 reps  Standing Hip Abduction - 2-3 x daily - 7 x weekly - 2-3 sets - 10 reps  Seated Hip Abduction - 2-3 x daily - 7 x weekly - 2-3 sets - 10 reps  Supine Knee Extension Strengthening - 2-3 x daily - 7 x weekly - 2-3 sets - 10 reps  Hooklying Clamshell with Resistance - 2-3 x daily - 7 x weekly - 2-3 sets - 10 reps      Plan: [x] Continue current frequency toward long and short term goals.   2 x/week for 18 visits  [x] Specific Instructions for subsequent treatments: Progress knee ex per Pt tolerance, increase step tolerance,      Time In:1402            Time Out: 1508    Electronically signed by:  Laura Olivas PTA

## 2022-11-29 ENCOUNTER — HOSPITAL ENCOUNTER (OUTPATIENT)
Dept: PHYSICAL THERAPY | Age: 77
Setting detail: THERAPIES SERIES
Discharge: HOME | End: 2022-11-29
Payer: MEDICARE

## 2022-11-29 PROCEDURE — 97110 THERAPEUTIC EXERCISES: CPT

## 2022-11-29 NOTE — FLOWSHEET NOTE
[x] [facility]  [address]  [phone]  F: [phone]         Physical Therapy Daily Treatment Note    Date:  2022  Patient Name:  Sushma Fernandez \"UKNMHR\"   :  1945  MRN: [id number]  Physician: Annabelle Godinez [facility]                                    Insurance: Lala Warren (medical necessity)  Medical Diagnosis: Primary OA R Knee M17.11                   Rehab Codes: M25.561, M25.661, R26.89, R26.81, M79.661, M25.561, M62.551, M62.551  Onset date: 10/06/2022                     Next Dr's appt.: unknown   Visit# / total visits: ; Progress note for Medicare patient due at visit 10     Cancels/No Shows: 0/0    Subjective:    Pain:  [x] Yes  [] No Location: R knee Pain Rating: (0-10 scale) 1-2/10  Pain altered Tx:  [x] No  [] Yes  Action:  Comments: Patient reports knee pain is getting better and better. Still has pain deep in the inside of the R knee.       Objective:  Modalities: HP to R knee, 5 min-Pt requests only 5 min due to gets too hot if on much longer  Precautions: Internal Cardiac Defibrillator   Exercises: R Knee  Exercise Reps/ Time Weight/ Level Comments   Scifit/Nustep 5 min Level 2          Standing      Heel raises 2x10 1 lb    Marching 2x10 ea 1 lb    HS curls  20x 1 lb    3 way hip 20x ea 1 lb bilateral   Step taps 10x 8 in bilateral   Step ups 10x ea 4 in bilateral   HS curls ADD                 Seated          Add sets  [id number]      Ankle pumps  20x     March  20x      LAQ 20x ea 2 lbs R, L    Hip abduction 20x blue    Hamstring curls 20x blue R, L         Supine      SAQ 20x 2 lbs     clamshells      SLR 20x ea A    Bridges  25x     Quad sets  15x 2-3\"                       Amb no cane  400 FT     Other:            Treatment Charges: Mins Units   [x]  Modalities: HP 5 0   [x]  Ther Exercise 40 3   []  Manual Therapy     []  Ther Activities     []  Aquatics     []  Vasocompression     []  Other Gait      Total Treatment time 40 3       Assessment: [x] Progressing toward goals. Good recall of exercises with min cueing for technique. Added back in toe taps on tall box to improve step clearance. Fatigued quickly with HS curls in seated, will add in standing next session to increase strength. Ended with HP to decrease pain. [] No change. [] Other:  [x] Patient would continue to benefit from skilled physical therapy services in order to: decrease pain R Knee, increase ROM R Knee, increase strength R LE, improve walking and steps, and decrease risk of falls        STG: (to be met in 10 treatments)  ? Pain: R knee 5/10 w/household walking, 7/10 at worst   ? ROM: R knee ext 6° to improve tolerance to laying down   ? Strength: R knee ext 4-/5, flex 4+/5, R hip 3+/5, to improve walking   ? Function: LEFS 64% loss of LE function  Pt amb household distances w/out device   Patient to be independent with home exercise program as demonstrated by performance with correct form without cues. Demonstrate Knowledge of fall prevention   LTG: (to be met in 18 treatments)  ? Pain: R knee 3/10 w/community amb, 5/10 at worst   ? ROM: R knee ext 4°, flex WNL w/out increased pain to improve tolerance to laying down and steps  ? Strength: R knee 4+/5, R hip 4-/5, to improve steps   ? Function: LEFS 50% loss of LE function  Pt safely amb community distances indep w/out device  Pt ascend/descend 1 flight steps, step-over step w/pain 3/10 or less                    Patient goals:\"eliminate problem\"       Pt. Education:  [x] Yes  [] No  [x] Reviewed Prior HEP/Ed  Method of Education: [x] Verbal  [x] Demo  [] Written  Comprehension of Education:  [x] Verbalizes understanding. [x] Demonstrates understanding. [] Needs review. [x] Demonstrates/verbalizes HEP/Ed previously given. 11/3 updated HEP-Access Code: [email or web address]  URL: [email or web address]  Date: 11/03/2022  Prepared by: Oksana Huerta and

## 2022-12-01 ENCOUNTER — HOSPITAL ENCOUNTER (OUTPATIENT)
Dept: PHYSICAL THERAPY | Age: 77
Setting detail: THERAPIES SERIES
Discharge: HOME | End: 2022-12-01
Payer: MEDICARE

## 2022-12-01 PROCEDURE — 97110 THERAPEUTIC EXERCISES: CPT

## 2022-12-01 NOTE — FLOWSHEET NOTE
[x] [facility] [facility]  [address]  [phone]  F: [phone]         Physical Therapy Daily Treatment Note    Date:  2022  Patient Name:  Ed Chavez \"NVGDSH\"   :  1945  MRN: [id number]  Physician: Terrence Kelly [facility]                                    Insurance: Leda Veloz (medical necessity)  Medical Diagnosis: Primary OA R Knee M17.11                   Rehab Codes: M25.561, M25.661, R26.89, R26.81, M79.661, M25.561, M62.551, M62.551  Onset date: 10/06/2022                     Next 's appt.: unknown   Visit# / total visits: 10/18; Progress note for Medicare patient due at visit 10     Cancels/No Shows: 0/0    Subjective:    Pain:  [x] Yes  [] No Location: R knee Pain Rating: (0-10 scale) 1-2/10  Pain altered Tx:  [x] No  [] Yes  Action:  Comments: Patient stated he no longer wears his knee brace and noted that things that used to be difficult are starting to get easier including walking around home and getting in and out of bed.      Objective:  Modalities: HP to R knee, 5 min-Pt requests only 5 min due to gets too hot if on much longer  Precautions: Internal Cardiac Defibrillator   Exercises: R Knee  Exercise Reps/ Time Weight/ Level Comments   Scifit/Nustep 5 min Level 2          Standing      Heel raises 2x10 1 lb    Marching 2x10 ea 1 lb    HS curls  20x 1 lb    3 way hip 20x ea 1 lb bilateral   Step taps 10x 8 in bilateral   Step ups 10x ea 6 in bilateral                     Seated          Add sets  [id number]      Ankle pumps  20x     March  20x      LAQ 20x ea 2 lbs R, L    Hip abduction 20x blue    Hamstring curls 20x blue R, L         Supine      SAQ 20x 2 lbs     clamshells      SLR 20x ea A    Bridges  25x     Quad sets                         Amb no cane  400 FT     Other:            Treatment Charges: Mins Units   [x]  Modalities: HP 5 0   [x]  Ther Exercise 55 4   []  Manual Therapy     []  Ther Activities     [] Aquatics     []  Vasocompression     []  Other Gait      Total Treatment time 55 4       Assessment: [x] Progressing toward goals. Progressed all ankle weight resistance to improve LE strength with good tolerance. Pt in need of 2 seated rest breaks due to fatigue, increase in rest breaks attributed to increased ankle weight. Pt then completed seated and supine activities with good tolerance. Pt continues to ambulate with no major compensations with out assistive device. [] No change. [] Other:  [x] Patient would continue to benefit from skilled physical therapy services in order to: decrease pain R Knee, increase ROM R Knee, increase strength R LE, improve walking and steps, and decrease risk of falls        STG: (to be met in 10 treatments)  ? Pain: R knee 5/10 w/household walking, 7/10 at worst   ? ROM: R knee ext 6° to improve tolerance to laying down   ? Strength: R knee ext 4-/5, flex 4+/5, R hip 3+/5, to improve walking   ? Function: LEFS 64% loss of LE function  Pt amb household distances w/out device   Patient to be independent with home exercise program as demonstrated by performance with correct form without cues. Demonstrate Knowledge of fall prevention   LTG: (to be met in 18 treatments)  ? Pain: R knee 3/10 w/community amb, 5/10 at worst   ? ROM: R knee ext 4°, flex WNL w/out increased pain to improve tolerance to laying down and steps  ? Strength: R knee 4+/5, R hip 4-/5, to improve steps   ? Function: LEFS 50% loss of LE function  Pt safely amb community distances indep w/out device  Pt ascend/descend 1 flight steps, step-over step w/pain 3/10 or less                    Patient goals:\"eliminate problem\"       Pt. Education:  [x] Yes  [] No  [x] Reviewed Prior HEP/Ed  Method of Education: [x] Verbal  [x] Demo  [] Written  Comprehension of Education:  [x] Verbalizes understanding. [x] Demonstrates understanding. [] Needs review. [x] Demonstrates/verbalizes HEP/Ed previously given.     11/3 updated HEP-Access Code: [email or web address]  URL: [email or web address]/  Date: 11/03/2022  Prepared by: [id number] [facility]  Seated Heel Toe Raises - 2-3 x daily - 7 x weekly - 20 reps  Seated Heel Slide - 2-3 x daily - 7 x weekly - 20 reps  Seated March - 2-3 x daily - 7 x weekly - 20 reps  Seated Hip Adduction Isometrics with Ball - 2-3 x daily - 7 x weekly - 10 reps - 5 sec hold  Standing Heel Raise - 2-3 x daily - 7 x weekly - 2-3 sets - 10 reps  Standing Marching - 2-3 x daily - 7 x weekly - 2-3 sets - 10 reps  Standing Marching - 2-3 x daily - 7 x weekly - 2-3 sets - 10 reps  Standing Hip Extension - 2-3 x daily - 7 x weekly - 2-3 sets - 10 reps  Standing Hip Abduction - 2-3 x daily - 7 x weekly - 2-3 sets - 10 reps  Seated Hip Abduction - 2-3 x daily - 7 x weekly - 2-3 sets - 10 reps  Supine Knee Extension Strengthening - 2-3 x daily - 7 x weekly - 2-3 sets - 10 reps  Hooklying Clamshell with Resistance - 2-3 x daily - 7 x weekly - 2-3 sets - 10 reps      Plan: [x] Continue current frequency toward long and short term goals.   2 x/week for 18 visits  [x] Specific Instructions for subsequent treatments: increase step height       Time In:1400           Time Out: 8454    Electronically signed by:  Felipe Lazo PTA

## 2022-12-06 ENCOUNTER — HOSPITAL ENCOUNTER (OUTPATIENT)
Dept: PHYSICAL THERAPY | Age: 77
Setting detail: THERAPIES SERIES
Discharge: HOME | End: 2022-12-06
Payer: MEDICARE

## 2022-12-06 PROCEDURE — 97110 THERAPEUTIC EXERCISES: CPT

## 2022-12-06 NOTE — PROGRESS NOTES
[x] [facility]  [address]  [phone]  F: [phone]         Physical Therapy Progress Note    Date: 2022      Patient: Dalia Nichols  : 1945  MRN: [id number]    Physician: Aileen Vail [facility]                                    Insurance: Lady Ou (medical necessity)  Medical Diagnosis: Primary OA R Knee M17.11                   Rehab Codes: M25.561, M25.661, R26.89, R26.81, M79.661, M25.561, M62.551, M62.551  Onset date: 10/06/2022                     Next Dr's appt.: unknown     Total visits attended:11  Cancels/No shows: 0/0  Date range of services: 2022 to 2022      Subjective:  Pain:  [x] Yes  [] No   Location: R knee         Pain Rating: (0-10 scale) 2/10  Pain altered Tx:  [x] No  [] Yes  Action:  Comments:  Pt going without his knee brace since last week. Knee isn't bad today, only hurts when putting weight on it. Pain at worst 6/10 when turning in bed. Knee also hurts when standing a long time up to 10, decreases w/movement and sitting down. When doing steps Pt has pain like nerve being pinched. Objective:  Test Measurements:    ROM DEGREES A/P STRENGTH      RIGHT RIGHT   HIP FLEX   4   HIPEXT       HIP ABD       HIP ADD               KNEE FLEX   4   KNEE EXT 11°p 4+           p=pain     Function:  LEFS 41% loss of function. Pt reports amb at home w/out s.cane half of the time. Patient reports he has been driving himself places last few days, but doesn't go long distances. Assessment:  STG:(to be met in 10 treatments)-Assessed   ? Pain: R knee 5/10 w/household walking, 7/10 at worst -Met  ? ROM: R knee ext 6° to improve tolerance to laying down-No change    ? Strength: R knee ext 4-/5, flex 4+/5, R hip 3+/5, to improve walking-Partially Met-Met for all but knee flex   ?  Function: LEFS 64% loss of LE function--Met  Pt amb household distances w/out device-Progress Toward   Patient to be independent with home exercise program as demonstrated by performance with correct form without cues-Progress Toward  Demonstrate Knowledge of fall prevention-Educated in, issued handout 12/6  LTG:(to be met in 18 treatments)  ? Pain: R knee 3/10 w/community amb, 5/10 at worst   ? ROM: R knee ext 4°, flex WNL w/out increased pain to improve tolerance to laying down and steps  ? Strength: R knee 4+/5, R hip 4-/5, to improve steps   ? Function: LEFS 50% loss of LE function  Pt safely amb community distances indep w/out device  Pt ascend/descend 1 flight steps, step-over step w/pain 3/10 or less    Treatment Plan:  [x] Therapeutic Exercise   81230  [] Iontophoresis: 4 mg/mL Dexamethasone Sodium Phosphate  mAmin  85654   [x] Therapeutic Activity  43196 [] Vasopneumatic cold with compression  23077    [x] Gait Training   44181 [] Ultrasound   20568   [] Neuromuscular Re-education  45643 [] Electrical Stimulation Unattended  39230   [] Manual Therapy  59543 [] Electrical Stimulation Attended  35137   [x] Instruction in HEP  [] Lumbar/Cervical Traction  79133   [] Aquatic Therapy   00351 [x] Cold/hotpack    [] Massage   76025      [] Dry Needling, 1 or 2 muscles  45926   [] Biofeedback, first 15 minutes   50321  [] Biofeedback, additional 15 minutes   57325 [] Dry Needling, 3 or more muscles  58558       Patient Status:     [x] Continue per initial plan of care. Pt demonstrates improvement in pain, strength, ambulation, and function    [] Additional visits necessary. [x] Other: Patient would continue to benefit from skilled physical therapy services in order to: decrease pain R Knee, increase ROM R Knee, increase strength R LE, improve walking and steps, and decrease risk of falls        Electronically signed by Warren Wood PT on 12/6/2022 at 12:52 PM        If you have any questions or concerns, please don't hesitate to call.   Thank you for your referral.    Physician Signature:________________________________Date:__________________  By signing above or cosigning this note, I have reviewed this plan of care and certify a need for medically necessary rehabilitation services.      *PLEASE SIGN ABOVE AND FAX BACK ALL PAGES*

## 2022-12-06 NOTE — FLOWSHEET NOTE
[x] [facility] [facility]  [address]  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 Pt reports he cont to go without his knee brace. Pt reports amb at home w/out s.cane half of the time. Knee isn't bad today, only hurts when putting weight on it. Pain at worst 6/10 when turning in bed. Knee also hurts when standing a long time up to 7/10, decreases w/movement and sitting down.   Pain like nerve being pinched when doing steps     Objective:  Modalities: HP to R knee, 5 min-Pt requests only 5-8 min due to gets too hot if on much longer  Precautions: Internal Cardiac Defibrillator   Exercises: R Knee  Exercise Reps/ Time Weight/ Level Comments   Scifit/Nustep 6 min Level 2          Standing      Heel raises 2x10 1 lb    Marching 2x10 ea 1 lb    HS curls  20x 1 lb    3 way hip 20x ea 1 lb bilateral   Step taps 15x 8 in Bilateral, progressed reps 12/6   Step ups 10x ea 4 in Bilateral; decreased step height due to pain 12/6   SLS *  Add soon   Lunges  *  Add soon         Seated          Add sets  [id number]     Ankle pumps  20x     March  20x     LAQ 20x ea 2 lbs R, L    Hip abduction 20x blue    Hamstring curls 20x blue R, L         Supine      SAQ 20x 2 lbs     clamshells      SLR 20x ea A    Bridges  25x     Quad sets  10x 3sec                      Amb no cane  400 FT     Other:        12/6 ROM DEGREES A/P STRENGTH     RIGHT RIGHT   HIP FLEX  4   HIPEXT     HIP ABD     HIP ADD          KNEE FLEX  4   KNEE EXT 11°p 4+        p=pain        Treatment Charges: Mins Units   [x]  Modalities: HP 8 0   [x]  Ther Exercise 54 4   []  Manual Therapy     []  Ther Activities     []  Aquatics     []  Vasocompression     []  Other Gait      Total Treatment time 54 4       Assessment: [x] Progressing toward goals. Pt demonstrates improvement in pain, strength, ambulation, and function. LEFS 41% loss of function. [x] No change. Pt cont w/deficit in ext ROM, painful w/quad sets. Pt complains of pain w/step ups, decreased step height w/improved tolerance. [] Other:  [x] Patient would continue to benefit from skilled physical therapy services in order to: decrease pain R Knee, increase ROM R Knee, increase strength R LE, improve walking and steps, and decrease risk of falls        STG: (to be met in 10 treatments)-Assessed 12/6  ? Pain: R knee 5/10 w/household walking, 7/10 at worst -Met  ? ROM: R knee ext 6° to improve tolerance to laying down-No change    ? Strength: R knee ext 4-/5, flex 4+/5, R hip 3+/5, to improve walking-Partially Met-Met for all but knee flex   ? Function: LEFS 64% loss of LE function--Met  Pt amb household distances w/out device-Progress Toward   Patient to be independent with home exercise program as demonstrated by performance with correct form without cues-Progress Toward  Demonstrate Knowledge of fall prevention-Educated in, issued handout 12/6  LTG: (to be met in 18 treatments)  ? Pain: R knee 3/10 w/community amb, 5/10 at worst   ? ROM: R knee ext 4°, flex WNL w/out increased pain to improve tolerance to laying down and steps  ?  Strength: R knee 4+/5, R hip 4-/5, to improve steps   ? Function: LEFS 50% loss of LE function  Pt safely amb community distances indep w/out device  Pt ascend/descend 1 flight steps, step-over step w/pain 3/10 or less                    Patient goals:\"eliminate problem\"       Pt. Education:  [x] Yes  [] No  [x] Reviewed Prior HEP/Ed  Method of Education: [x] Verbal  [] Demo  [x] Written  Comprehension of Education:  [x] Verbalizes understanding. [] Demonstrates understanding. [] Needs review. [x] Demonstrates/verbalizes HEP/Ed previously given. 12/6 Fall prevention    11/3 updated HEP-Access Code: [email or web address]  URL: [email or web address]  Date: 11/03/2022  Prepared by: [id number] [facility]  Seated Heel Toe Raises - 2-3 x daily - 7 x weekly - 20 reps  Seated Heel Slide - 2-3 x daily - 7 x weekly - 20 reps  Seated March - 2-3 x daily - 7 x weekly - 20 reps  Seated Hip Adduction Isometrics with Ball - 2-3 x daily - 7 x weekly - 10 reps - 5 sec hold  Standing Heel Raise - 2-3 x daily - 7 x weekly - 2-3 sets - 10 reps  Standing Marching - 2-3 x daily - 7 x weekly - 2-3 sets - 10 reps  Standing Marching - 2-3 x daily - 7 x weekly - 2-3 sets - 10 reps  Standing Hip Extension - 2-3 x daily - 7 x weekly - 2-3 sets - 10 reps  Standing Hip Abduction - 2-3 x daily - 7 x weekly - 2-3 sets - 10 reps  Seated Hip Abduction - 2-3 x daily - 7 x weekly - 2-3 sets - 10 reps  Supine Knee Extension Strengthening - 2-3 x daily - 7 x weekly - 2-3 sets - 10 reps  Hooklying Clamshell with Resistance - 2-3 x daily - 7 x weekly - 2-3 sets - 10 reps      Plan: [x] Continue current frequency toward long and short term goals.   2 x/week for 18 visits  [x] Specific Instructions for subsequent treatments: add SLS, lunges soon      Time In:1257           Time Out: 4226    Electronically signed by:  Barry New, PT

## 2022-12-08 ENCOUNTER — HOSPITAL ENCOUNTER (OUTPATIENT)
Dept: PHYSICAL THERAPY | Age: 77
Setting detail: THERAPIES SERIES
Discharge: HOME | End: 2022-12-08
Payer: MEDICARE

## 2022-12-08 PROCEDURE — 97110 THERAPEUTIC EXERCISES: CPT

## 2022-12-08 NOTE — FLOWSHEET NOTE
KNEE FLEX  4   KNEE EXT 11°p 4+        p=pain        Treatment Charges: Mins Units   [x]  Modalities: HP 5    [x]  Ther Exercise 48 3   []  Manual Therapy     []  Ther Activities     []  Aquatics     []  Vasocompression     []  Other Gait      Total Treatment time 48 3       Assessment: [x] Progressing toward goals. Pt with good tolerance to all activities this date. Pt noted slight inccrease in tibialis anterior pain during heel raises. Increased ankle weight to improve LE strength. Held ambulation due to pt ambulating to and from clinic from his car. Pt received HP at end of session to decrease pain level. [] No change. [] Other:  [x] Patient would continue to benefit from skilled physical therapy services in order to: decrease pain R Knee, increase ROM R Knee, increase strength R LE, improve walking and steps, and decrease risk of falls        STG: (to be met in 10 treatments)-Assessed 12/6  ? Pain: R knee 5/10 w/household walking, 7/10 at worst -Met  ? ROM: R knee ext 6° to improve tolerance to laying down-No change    ? Strength: R knee ext 4-/5, flex 4+/5, R hip 3+/5, to improve walking-Partially Met-Met for all but knee flex   ? Function: LEFS 64% loss of LE function--Met  Pt amb household distances w/out device-Progress Toward   Patient to be independent with home exercise program as demonstrated by performance with correct form without cues-Progress Toward  Demonstrate Knowledge of fall prevention-Educated in, issued handout 12/6  LTG: (to be met in 18 treatments)  ? Pain: R knee 3/10 w/community amb, 5/10 at worst   ? ROM: R knee ext 4°, flex WNL w/out increased pain to improve tolerance to laying down and steps  ? Strength: R knee 4+/5, R hip 4-/5, to improve steps   ?  Function: LEFS 50% loss of LE function  Pt safely amb community distances indep w/out device  Pt ascend/descend 1 flight steps, step-over step w/pain 3/10 or less                    Patient goals:\"eliminate problem\"       Pt. Education:  [x] Yes  [] No  [x] Reviewed Prior HEP/Ed  Method of Education: [x] Verbal  [] Demo  [x] Written  Comprehension of Education:  [x] Verbalizes understanding. [] Demonstrates understanding. [] Needs review. [x] Demonstrates/verbalizes HEP/Ed previously given. 12/6 Fall prevention    11/3 updated HEP-Access Code: [email or web address]  URL: [email or web address]  Date: 11/03/2022  Prepared by: [id number] [facility]  Seated Heel Toe Raises - 2-3 x daily - 7 x weekly - 20 reps  Seated Heel Slide - 2-3 x daily - 7 x weekly - 20 reps  Seated March - 2-3 x daily - 7 x weekly - 20 reps  Seated Hip Adduction Isometrics with Ball - 2-3 x daily - 7 x weekly - 10 reps - 5 sec hold  Standing Heel Raise - 2-3 x daily - 7 x weekly - 2-3 sets - 10 reps  Standing Marching - 2-3 x daily - 7 x weekly - 2-3 sets - 10 reps  Standing Marching - 2-3 x daily - 7 x weekly - 2-3 sets - 10 reps  Standing Hip Extension - 2-3 x daily - 7 x weekly - 2-3 sets - 10 reps  Standing Hip Abduction - 2-3 x daily - 7 x weekly - 2-3 sets - 10 reps  Seated Hip Abduction - 2-3 x daily - 7 x weekly - 2-3 sets - 10 reps  Supine Knee Extension Strengthening - 2-3 x daily - 7 x weekly - 2-3 sets - 10 reps  Hooklying Clamshell with Resistance - 2-3 x daily - 7 x weekly - 2-3 sets - 10 reps      Plan: [x] Continue current frequency toward long and short term goals. 2 x/week for 18 visits  [x] Specific Instructions for subsequent treatments:Progress reps as tolerated.        Time In:1400           Time Out: 5835    Electronically signed by:  Steff Schmitz PTA

## 2022-12-13 ENCOUNTER — HOSPITAL ENCOUNTER (OUTPATIENT)
Dept: PHYSICAL THERAPY | Age: 77
Setting detail: THERAPIES SERIES
Discharge: HOME | End: 2022-12-13
Payer: MEDICARE

## 2022-12-13 PROCEDURE — 97110 THERAPEUTIC EXERCISES: CPT

## 2022-12-13 NOTE — FLOWSHEET NOTE
[x] [facility]  [address]  [phone]  F: [phone]         Physical Therapy Daily Treatment Note    Date:  2022  Patient Name:  Carlyn Spence \"WBOAYM\"   :  1945  MRN: [id number]  Physician: Madalyn Griffin [facility]                                    Insurance: [employer] [place] (medical necessity)  Medical Diagnosis: Primary OA R Knee M17.11                   Rehab Codes: M25.561, M25.661, R26.89, R26.81, M79.661, M25.561, M62.551, M62.551  Onset date: 10/06/2022                     Next Dr's appt.: unknown   Visit# / total visits: ; Progress note for Medicare patient due at visit 18     Cancels/No Shows: 0/0    Subjective:    Pain:  [x] Yes  [] No Location: R knee Pain Rating: (0-10 scale) 4-5/10  Pain altered Tx:  [x] No  [] Yes  Action:  Comments: Patient arrives with increase R knee pain. Stated he walked around [place] and they did not have a scooter.       Objective:  Modalities: HP to R knee, 5 min-Pt requests only 5 min due to gets too hot if on much longer  Precautions: Internal Cardiac Defibrillator   Exercises: R Knee  Exercise Reps/ Time Weight/ Level Comments   Scifit/Nustep 6 min Level 3          Standing      Heel raises 2x10 1 lb    Marching 2x10 ea 1 lb Single leg   HS curls  20x 1 lb    3 way hip 20x ea 1 lb bilateral   Step taps 15x 8 in Bilateral, progressed reps 12/6   Step ups 10x ea 4 in Bilateral; poor trial of 6\" on 12.13   SLS 2x20\"  No UE   Lunges  10x EA           Seated          Add sets  [id number]     Ankle pumps  20x     March  20x 1 lb    LAQ 20x ea 2 lbs R, L    Hip abduction 20x blue    Hamstring curls 20x blue R, L   Toe raises 20x  Added 12.13         Supine      SAQ 20x 2 lbs     Clamshells      SLR 20x ea A    Bridges  25x     Quad sets  10x 3sec                      Amb no cane  400 FT     Other:        12 ROM DEGREES A/P STRENGTH     RIGHT RIGHT   HIP FLEX  4   HIPEXT     HIP ABD     HIP ADD KNEE FLEX  4   KNEE EXT 11°p 4+        p=pain        Treatment Charges: Mins Units   [x]  Modalities: HP 5 0   [x]  Ther Exercise 46 3   []  Manual Therapy     []  Ther Activities     []  Aquatics     []  Vasocompression     []  Other Gait      Total Treatment time 46 3       Assessment: [x] Progressing toward goals. Completed standing marches with one leg at a time to increase strength and muscular endurance. Toe raises added to increase anterior tibialis strength necessary for swing through gait phase. Weights added with seated marches. Tight hamstrings noted with posterior lean during LAQ. Ended with HP to R knee to decrease pain. [] No change. [] Other:  [x] Patient would continue to benefit from skilled physical therapy services in order to: decrease pain R Knee, increase ROM R Knee, increase strength R LE, improve walking and steps, and decrease risk of falls        STG: (to be met in 10 treatments)-Assessed 12/6  ? Pain: R knee 5/10 w/household walking, 7/10 at worst -Met  ? ROM: R knee ext 6° to improve tolerance to laying down-No change    ? Strength: R knee ext 4-/5, flex 4+/5, R hip 3+/5, to improve walking-Partially Met-Met for all but knee flex   ? Function: LEFS 64% loss of LE function--Met  Pt amb household distances w/out device-Progress Toward   Patient to be independent with home exercise program as demonstrated by performance with correct form without cues-Progress Toward  Demonstrate Knowledge of fall prevention-Educated in, issued handout 12/6  LTG: (to be met in 18 treatments)  ? Pain: R knee 3/10 w/community amb, 5/10 at worst   ? ROM: R knee ext 4°, flex WNL w/out increased pain to improve tolerance to laying down and steps  ? Strength: R knee 4+/5, R hip 4-/5, to improve steps   ?  Function: LEFS 50% loss of LE function  Pt safely amb community distances indep w/out device  Pt ascend/descend 1 flight steps, step-over step w/pain 3/10 or less                    Patient goals:\"eliminate problem\"       Pt. Education:  [x] Yes  [] No  [x] Reviewed Prior HEP/Ed  Method of Education: [x] Verbal  [x] Demo  [] Written  Comprehension of Education:  [x] Verbalizes understanding. [x] Demonstrates understanding. [] Needs review. [x] Demonstrates/verbalizes HEP/Ed previously given. 12/6 Fall prevention    11/3 updated HEP-Access Code: [email or web address]  URL: [email or web address]  Date: 11/03/2022  Prepared by: [facility]  Seated Heel Toe Raises - 2-3 x daily - 7 x weekly - 20 reps  Seated Heel Slide - 2-3 x daily - 7 x weekly - 20 reps  Seated March - 2-3 x daily - 7 x weekly - 20 reps  Seated Hip Adduction Isometrics with Ball - 2-3 x daily - 7 x weekly - 10 reps - 5 sec hold  Standing Heel Raise - 2-3 x daily - 7 x weekly - 2-3 sets - 10 reps  Standing Marching - 2-3 x daily - 7 x weekly - 2-3 sets - 10 reps  Standing Marching - 2-3 x daily - 7 x weekly - 2-3 sets - 10 reps  Standing Hip Extension - 2-3 x daily - 7 x weekly - 2-3 sets - 10 reps  Standing Hip Abduction - 2-3 x daily - 7 x weekly - 2-3 sets - 10 reps  Seated Hip Abduction - 2-3 x daily - 7 x weekly - 2-3 sets - 10 reps  Supine Knee Extension Strengthening - 2-3 x daily - 7 x weekly - 2-3 sets - 10 reps  Hooklying Clamshell with Resistance - 2-3 x daily - 7 x weekly - 2-3 sets - 10 reps      Plan: [x] Continue current frequency toward long and short term goals. 2 x/week for 18 visits  [x] Specific Instructions for subsequent treatments:Progress reps as tolerated.        Time In: 1007           Time Out: 1058    Electronically signed by:  Gerson Luna PTA

## 2022-12-15 ENCOUNTER — HOSPITAL ENCOUNTER (OUTPATIENT)
Dept: PHYSICAL THERAPY | Age: 77
Setting detail: THERAPIES SERIES
Discharge: HOME | End: 2022-12-15
Payer: MEDICARE

## 2022-12-15 PROCEDURE — 97110 THERAPEUTIC EXERCISES: CPT

## 2022-12-15 NOTE — FLOWSHEET NOTE
[x] [facility]  [address]  [phone]  F: [phone]         Physical Therapy Daily Treatment Note    Date:  12/15/2022  Patient Name:  Tyrone Beltran \"BIQSKT\"   :  1945  MRN: [id number]  Physician: Zohaib Kaminski [facility]                                    Insurance: [employer] (medical necessity)  Medical Diagnosis: Primary OA R Knee M17.11                   Rehab Codes: M25.561, M25.661, R26.89, R26.81, M79.661, M25.561, M62.551, M62.551  Onset date: 10/06/2022                     Next Dr's appt.: unknown   Visit# / total visits: ; Progress note for Medicare patient due at visit 18     Cancels/No Shows: 0/0    Subjective:    Pain:  [x] Yes  [] No Location: R knee Pain Rating: (0-10 scale) 1-2/10  Pain altered Tx:  [x] No  [] Yes  Action:  Comments: Patient reported that he is currently feeling ok and has a low pain level. Stated though that pain increases with activity and can get up to 5/10.      Objective:  Modalities: HP to R knee, 5 min-Pt requests only 5 min due to gets too hot if on much longer  Precautions: Internal Cardiac Defibrillator   Exercises: R Knee  Exercise Reps/ Time Weight/ Level Comments   Scifit/Nustep 6 min Level 3          Standing      Heel raises 2x10 1 lb    Marching 2x10 ea 1 lb    HS curls  20x 1 lb    3 way hip 20x ea 1 lb bilateral   Step taps 15x 8 in Bilateral, progressed reps 12/6   Step ups 10x ea 4 in Bilateral; poor trial of 6\" on 12.13   SLS 2x20\"  No UE   Lunges  10x EA           Seated          Add sets  [id number]     Ankle pumps  20x     March  20x 1 lb    LAQ 20x ea 2 lbs R, L    Hip abduction 20x blue    Hamstring curls 20x blue R, L   Toe raises 20x  Added 12.13         Supine      SAQ 20x 2 lbs     Clamshells      SLR 20x ea A    Bridges  25x     Quad sets  10x 3sec                      Amb no cane  400 FT     Other:        126 ROM DEGREES A/P STRENGTH     RIGHT RIGHT   HIP FLEX  4 HIPEXT     HIP ABD     HIP ADD          KNEE FLEX  4   KNEE EXT 11°p 4+        p=pain        Treatment Charges: Mins Units   [x]  Modalities: HP 5 0   [x]  Ther Exercise 44 3   []  Manual Therapy     []  Ther Activities     []  Aquatics     []  Vasocompression     []  Other Gait      Total Treatment time 44 3       Assessment: [x] Progressing toward goals. Pt with improved tolerance to activities this date. Pt noted good compliance with progressions from last session. No further progressions this date due to fatigue. Pt was able to complete all reps as listed above. Noted hamstring tightness. Pt received HP at end of session to decrease knee pain. [] No change. [] Other:  [x] Patient would continue to benefit from skilled physical therapy services in order to: decrease pain R Knee, increase ROM R Knee, increase strength R LE, improve walking and steps, and decrease risk of falls        STG: (to be met in 10 treatments)-Assessed 12/6  ? Pain: R knee 5/10 w/household walking, 7/10 at worst -Met  ? ROM: R knee ext 6° to improve tolerance to laying down-No change    ? Strength: R knee ext 4-/5, flex 4+/5, R hip 3+/5, to improve walking-Partially Met-Met for all but knee flex   ? Function: LEFS 64% loss of LE function--Met  Pt amb household distances w/out device-Progress Toward   Patient to be independent with home exercise program as demonstrated by performance with correct form without cues-Progress Toward  Demonstrate Knowledge of fall prevention-Educated in, issued handout 12/6  LTG: (to be met in 18 treatments)  ? Pain: R knee 3/10 w/community amb, 5/10 at worst   ? ROM: R knee ext 4°, flex WNL w/out increased pain to improve tolerance to laying down and steps  ? Strength: R knee 4+/5, R hip 4-/5, to improve steps   ?  Function: LEFS 50% loss of LE function  Pt safely amb community distances indep w/out device  Pt ascend/descend 1 flight steps, step-over step w/pain 3/10 or less Patient goals:\"eliminate problem\"       Pt. Education:  [x] Yes  [] No  [x] Reviewed Prior HEP/Ed  Method of Education: [x] Verbal  [x] Demo  [] Written  Comprehension of Education:  [x] Verbalizes understanding. [x] Demonstrates understanding. [] Needs review. [x] Demonstrates/verbalizes HEP/Ed previously given. 12/6 Fall prevention    11/3 updated HEP-Access Code: [email or web address]  URL: [email or web address]  Date: 11/03/2022  Prepared by: [facility]  Seated Heel Toe Raises - 2-3 x daily - 7 x weekly - 20 reps  Seated Heel Slide - 2-3 x daily - 7 x weekly - 20 reps  Seated March - 2-3 x daily - 7 x weekly - 20 reps  Seated Hip Adduction Isometrics with Ball - 2-3 x daily - 7 x weekly - 10 reps - 5 sec hold  Standing Heel Raise - 2-3 x daily - 7 x weekly - 2-3 sets - 10 reps  Standing Marching - 2-3 x daily - 7 x weekly - 2-3 sets - 10 reps  Standing Marching - 2-3 x daily - 7 x weekly - 2-3 sets - 10 reps  Standing Hip Extension - 2-3 x daily - 7 x weekly - 2-3 sets - 10 reps  Standing Hip Abduction - 2-3 x daily - 7 x weekly - 2-3 sets - 10 reps  Seated Hip Abduction - 2-3 x daily - 7 x weekly - 2-3 sets - 10 reps  Supine Knee Extension Strengthening - 2-3 x daily - 7 x weekly - 2-3 sets - 10 reps  Hooklying Clamshell with Resistance - 2-3 x daily - 7 x weekly - 2-3 sets - 10 reps      Plan: [x] Continue current frequency toward long and short term goals. 2 x/week for 18 visits  [x] Specific Instructions for subsequent treatments:Progress reps as tolerated.        Time In: 1003           Time Out: 1058    Electronically signed by:  Daniel Tanner PTA

## 2022-12-22 ENCOUNTER — HOSPITAL ENCOUNTER (OUTPATIENT)
Dept: PHYSICAL THERAPY | Age: 77
Setting detail: THERAPIES SERIES
Discharge: HOME | End: 2022-12-22
Payer: MEDICARE

## 2022-12-22 PROCEDURE — 97110 THERAPEUTIC EXERCISES: CPT

## 2022-12-22 NOTE — FLOWSHEET NOTE
[x] [facility]  [address]  [phone]  F: [phone]         Physical Therapy Daily Treatment Note    Date:  2022  Patient Name:  Susannah Solano \"ZISVMZ\"   :  1945  MRN: [id number]  Physician: Oyl Quiroz [facility]                                    Insurance: Pedro Mcdonough (medical necessity)  Medical Diagnosis: Primary OA R Knee M17.11                   Rehab Codes: M25.561, M25.661, R26.89, R26.81, M79.661, M25.561, M62.551, M62.551  Onset date: 10/06/2022                     Next Dr's appt.: unknown   Visit# / total visits: 15/18; Progress note for Medicare patient due at visit 18     Cancels/No Shows: 0/1    Subjective:    Pain:  [x] Yes  [] No Location: R knee Pain Rating: (0-10 scale) 1-2/10  Pain altered Tx:  [x] No  [] Yes  Action:  Comments: Patient stated that he hasnt been having to much pain lately most of his pain is felt when doing steps unless he does them one at a time.      Objective:  Modalities: HP to R knee, 5 min-Pt requests only 5 min due to gets too hot if on much longer  Precautions: Internal Cardiac Defibrillator   Exercises: R Knee  Exercise Reps/ Time Weight/ Level Comments   Scifit/Nustep 6 min Level 3          Standing      Heel raises 2x10 1.5 lb    Marching 2x10 ea 1.5 lb    HS curls  20x 1.5 lb    3 way hip 20x ea 1.5 lb bilateral   Step taps 15x 8 in Bilateral, progressed reps 12/6   Step ups 10x ea 4 in Bilateral; poor trial of 6\" on 12.13   SLS 2x20\"  No UE   Lunges  10x EA           Seated          Add sets  [id number]     Ankle pumps  20x     March  20x 1.5 lb    LAQ 20x ea 2 lbs R, L    Hip abduction 20x blue    Hamstring curls 20x blue R, L   Toe raises 20x  Added 12.13         Supine      SAQ 20x 2 lbs     Clamshells      SLR 20x ea A    Bridges  25x     Quad sets  10x 3sec                      Amb no cane       Other:        12/6 ROM DEGREES A/P STRENGTH     RIGHT RIGHT   HIP FLEX  4   HIPEXT HIP ABD     HIP ADD          KNEE FLEX  4   KNEE EXT 11°p 4+        p=pain        Treatment Charges: Mins Units   [x]  Modalities: HP 5 0   [x]  Ther Exercise 46 3   []  Manual Therapy     []  Ther Activities     []  Aquatics     []  Vasocompression     []  Other Gait      Total Treatment time 46 3       Assessment: [x] Progressing toward goals. Minimal progressions today due to increased pain with 2 lbs weight. Increased resistance from 1 lb to 1.5 lbs to improve LE strength. Continued with exercise and building blocks of functional activity with good tolerance. Pt received HP at end of session. [] No change. [] Other:  [x] Patient would continue to benefit from skilled physical therapy services in order to: decrease pain R Knee, increase ROM R Knee, increase strength R LE, improve walking and steps, and decrease risk of falls        STG: (to be met in 10 treatments)-Assessed 12/6  ? Pain: R knee 5/10 w/household walking, 7/10 at worst -Met  ? ROM: R knee ext 6° to improve tolerance to laying down-No change    ? Strength: R knee ext 4-/5, flex 4+/5, R hip 3+/5, to improve walking-Partially Met-Met for all but knee flex   ? Function: LEFS 64% loss of LE function--Met  Pt amb household distances w/out device-Progress Toward   Patient to be independent with home exercise program as demonstrated by performance with correct form without cues-Progress Toward  Demonstrate Knowledge of fall prevention-Educated in, issued handout 12/6  LTG: (to be met in 18 treatments)  ? Pain: R knee 3/10 w/community amb, 5/10 at worst   ? ROM: R knee ext 4°, flex WNL w/out increased pain to improve tolerance to laying down and steps  ? Strength: R knee 4+/5, R hip 4-/5, to improve steps   ? Function: LEFS 50% loss of LE function  Pt safely amb community distances indep w/out device  Pt ascend/descend 1 flight steps, step-over step w/pain 3/10 or less                    Patient goals:\"eliminate problem\"       Pt.  Education:

## 2022-12-27 ENCOUNTER — HOSPITAL ENCOUNTER (OUTPATIENT)
Dept: PHYSICAL THERAPY | Age: 77
Setting detail: THERAPIES SERIES
Discharge: HOME | End: 2022-12-27
Payer: MEDICARE

## 2022-12-27 PROCEDURE — 97110 THERAPEUTIC EXERCISES: CPT

## 2022-12-27 NOTE — FLOWSHEET NOTE
[x] [facility]  [address]  [phone]  F: [phone]         Physical Therapy Daily Treatment Note    Date:  2022  Patient Name:  Edson Lackey \"MWBQBC\"   :  1945  MRN: [id number]  Physician: [facility] [place] 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     Objective:  Modalities: HP to R knee, 5 min-Pt requests only 5 min due to gets too hot if on much longer  Precautions: Internal Cardiac Defibrillator   Exercises: R Knee  Exercise Reps/ Time Weight/ Level Comments   Scifit/Nustep 6 min Level 3          Standing      Heel raises   2x10 1.5 lb    Marching    2x10 ea 1.5 lb    HS curls    20x 1.5 lb    3 way hip  20x ea 1.5 lb bilateral   Step taps  15x 8 in Bilateral, progressed reps 12/6   Step ups 10x ea 4 in Bilateral; poor trial of 6\" on 12.13   SLS  2x20\"  No UE   Lunges  10x EA           Seated          Add sets   [id number]     Ankle pumps   20x     March    20x 1.5 lb    LAQ   20x ea 1.5 lbs R, L    Hip abduction 20x blue    Hamstring curls  20x blue R, L   Toe raises   20x  Added 12.13         Supine      SAQ  20x 1.5 lbs     Clamshells      SLR   20x ea A    Bridges   25x     Quad sets  10x 3sec                      Amb no cane       Other:        12/6 ROM DEGREES A/P STRENGTH     RIGHT RIGHT   HIP FLEX  4   HIPEXT     HIP ABD     HIP ADD          KNEE FLEX 4   KNEE EXT 11°p 4+        p=pain        Treatment Charges: Mins Units   [x]  Modalities: HP 5 0   [x]  Ther Exercise 42 3   []  Manual Therapy     []  Ther Activities     []  Aquatics     []  Vasocompression     []  Other Gait      Total Treatment time 42 mins 3       Assessment: [x] Progressing toward goals. Continued exercises per log with cues for exercise techniques and progressions with fairly good carryover. Ended with hot pack to knee for 5 mins at end of session. [] No change. [] Other:  [x] Patient would continue to benefit from skilled physical therapy services in order to: decrease pain R Knee, increase ROM R Knee, increase strength R LE, improve walking and steps, and decrease risk of falls        STG: (to be met in 10 treatments)-Assessed 12/6  ? Pain: R knee 5/10 w/household walking, 7/10 at worst -Met  ? ROM: R knee ext 6° to improve tolerance to laying down-No change    ? Strength: R knee ext 4-/5, flex 4+/5, R hip 3+/5, to improve walking-Partially Met-Met for all but knee flex   ? Function: LEFS 64% loss of LE function--Met  Pt amb household distances w/out device-Progress Toward   Patient to be independent with home exercise program as demonstrated by performance with correct form without cues-Progress Toward  Demonstrate Knowledge of fall prevention-Educated in, issued handout 12/6  LTG: (to be met in 18 treatments)  ? Pain: R knee 3/10 w/community amb, 5/10 at worst   ? ROM: R knee ext 4°, flex WNL w/out increased pain to improve tolerance to laying down and steps  ? Strength: R knee 4+/5, R hip 4-/5, to improve steps   ? Function: LEFS 50% loss of LE function  Pt safely amb community distances indep w/out device  Pt ascend/descend 1 flight steps, step-over step w/pain 3/10 or less                    Patient goals:\"eliminate problem\"       Pt.  Education:  [x] Yes  [] No  [x] Reviewed Prior HEP/Ed  Method of Education: [x] Verbal  [x] Demo  [] Written  Comprehension of Education:  [x] Verbalizes understanding. [x] Demonstrates understanding. [] Needs review. [x] Demonstrates/verbalizes HEP/Ed previously given. 12/6 Fall prevention    11/3 updated HEP-Access Code: [email or web address]  URL: [email or web address]  Date: 11/03/2022  Prepared by: [facility]  Seated Heel Toe Raises - 2-3 x daily - 7 x weekly - 20 reps  Seated Heel Slide - 2-3 x daily - 7 x weekly - 20 reps  Seated March - 2-3 x daily - 7 x weekly - 20 reps  Seated Hip Adduction Isometrics with Ball - 2-3 x daily - 7 x weekly - 10 reps - 5 sec hold  Standing Heel Raise - 2-3 x daily - 7 x weekly - 2-3 sets - 10 reps  Standing Marching - 2-3 x daily - 7 x weekly - 2-3 sets - 10 reps  Standing Marching - 2-3 x daily - 7 x weekly - 2-3 sets - 10 reps  Standing Hip Extension - 2-3 x daily - 7 x weekly - 2-3 sets - 10 reps  Standing Hip Abduction - 2-3 x daily - 7 x weekly - 2-3 sets - 10 reps  Seated Hip Abduction - 2-3 x daily - 7 x weekly - 2-3 sets - 10 reps  Supine Knee Extension Strengthening - 2-3 x daily - 7 x weekly - 2-3 sets - 10 reps  Hooklying Clamshell with Resistance - 2-3 x daily - 7 x weekly - 2-3 sets - 10 reps      Plan: [x] Continue current frequency toward long and short term goals. 2 x/week for 18 visits  [x] Specific Instructions for subsequent treatments:Progress reps as tolerated.        Time In: 1:02 pm          Time Out: 1:56 pm    Electronically signed by:  Yu Stein PTA

## 2022-12-29 ENCOUNTER — HOSPITAL ENCOUNTER (OUTPATIENT)
Dept: PHYSICAL THERAPY | Age: 77
Setting detail: THERAPIES SERIES
Discharge: HOME | End: 2022-12-29
Payer: MEDICARE

## 2022-12-29 PROCEDURE — 97110 THERAPEUTIC EXERCISES: CPT

## 2022-12-29 NOTE — FLOWSHEET NOTE
[x] [facility]  [address]  [phone]  F: [phone]         Physical Therapy Daily Treatment Note    Date:  2022  Patient Name:  Wilfrido Dotson \"MPZAZN\"   :  1945  MRN: [id number]  Physician: Alin Dotson [facility]                                    Insurance: Gabbi Rothdarling (medical necessity)  Medical Diagnosis: Primary OA R Knee M17.11                   Rehab Codes: M25.561, M25.661, R26.89, R26.81, M79.661, M25.561, M62.551, M62.551  Onset date: 10/06/2022                     Next Dr's appt.: unknown   Visit# / total visits: ; Progress note for Medicare patient due at visit 18     Cancels/No Shows: 0/1    Subjective:    Pain:  [x] Yes  [] No Location: R knee Pain Rating: (0-10 scale) 1-2/10  Pain altered Tx:  [x] No  [] Yes  Action:  Comments: States he has pain with walking and standing too long rating 1-2/10.     Objective:  Modalities: HP to R knee, 5 min-Pt requests only 5 min due to gets too hot if on much longer  Precautions: Internal Cardiac Defibrillator   Exercises: R Knee  Exercise Reps/ Time Weight/ Level Comments   Scifit/Nustep 6 min Level 3          Standing      Heel raises   2x10 1.5 lb    Marching    2x10 ea 1.5 lb    HS curls    20x 1.5 lb    3 way hip  20x ea 1.5 lb bilateral   Step taps  15x 8 in Bilateral, progressed reps 12/6   Step ups 10x ea 4 in Bilateral; poor trial of 6\" on 12.13   SLS  2x20\"  No UE   Lunges  10x EA           Seated          Add sets   [id number]     Ankle pumps        March    20x 1.5 lb    LAQ   20x ea 1.5 lbs R, L    Hip abduction 20x blue    Hamstring curls  20x blue R, L   Toe raises   20x  Added 12.13         Supine      SAQ  20x 1.5 lbs     Clamshells      SLR   20x ea A    Bridges   25x     Quad sets  10x 3sec                      Amb no cane       Other:        12/6 ROM DEGREES A/P STRENGTH     RIGHT RIGHT   HIP FLEX  4   HIPEXT     HIP ABD     HIP ADD          KNEE FLEX  4 KNEE EXT 11°p 4+        p=pain        Treatment Charges: Mins Units   [x]  Modalities: HP     [x]  Ther Exercise 47 3   []  Manual Therapy     []  Ther Activities     []  Aquatics     []  Vasocompression     []  Other Gait      Total Treatment time 47 mins 3       Assessment: [x] Progressing toward goals. Pt with good tolerance to all activities. Spent extra time answering questions about technique and making sure pt is ready for HEP program. Pt wishes to be discharged. Goal check completed below. [] No change. [] Other:  [x] Patient would continue to benefit from skilled physical therapy services in order to: decrease pain R Knee, increase ROM R Knee, increase strength R LE, improve walking and steps, and decrease risk of falls        STG: (to be met in 10 treatments)-Assessed 12/6  ? Pain: R knee 5/10 w/household walking, 7/10 at worst -Met  ? ROM: R knee ext 6° to improve tolerance to laying down-No change    ? Strength: R knee ext 4-/5, flex 4+/5, R hip 3+/5, to improve walking-Partially Met-Met for all but knee flex   ? Function: LEFS 64% loss of LE function--Met  Pt amb household distances w/out device-Progress Toward   Patient to be independent with home exercise program as demonstrated by performance with correct form without cues-Progress Toward  Demonstrate Knowledge of fall prevention-Educated in, issued handout 12/6  LTG: (to be met in 18 treatments)  ? Pain: R knee 3/10 w/community amb, 5/10 at worst MET  ? ROM: R knee ext 4°, flex WNL w/out increased pain to improve tolerance to laying down and steps MET 0 deg ext, 115 flex  ? Strength: R knee 4+/5, R hip 4-/5, to improve steps MET R hip 5/5 all, R knee 5/5 ext, 4+/5 flex  ? Function: LEFS 50% loss of LE function MET 26.25%   Pt safely amb community distances indep w/out device MET  Pt ascend/descend 1 flight steps, step-over step w/pain 3/10 or less Progress. Unable to do step over step increases pain in R knee. Pain up to 4/10. Patient goals:\"eliminate problem\"       Pt. Education:  [x] Yes  [] No  [x] Reviewed Prior HEP/Ed  Method of Education: [x] Verbal  [x] Demo  [] Written  Comprehension of Education:  [x] Verbalizes understanding. [x] Demonstrates understanding. [] Needs review. [x] Demonstrates/verbalizes HEP/Ed previously given. 12/6 Fall prevention    11/3 updated HEP-Access Code: [email or web address]  URL: [email or web address]  Date: 11/03/2022  Prepared by: [facility]  Seated Heel Toe Raises - 2-3 x daily - 7 x weekly - 20 reps  Seated Heel Slide - 2-3 x daily - 7 x weekly - 20 reps  Seated March - 2-3 x daily - 7 x weekly - 20 reps  Seated Hip Adduction Isometrics with Ball - 2-3 x daily - 7 x weekly - 10 reps - 5 sec hold  Standing Heel Raise - 2-3 x daily - 7 x weekly - 2-3 sets - 10 reps  Standing Marching - 2-3 x daily - 7 x weekly - 2-3 sets - 10 reps  Standing Marching - 2-3 x daily - 7 x weekly - 2-3 sets - 10 reps  Standing Hip Extension - 2-3 x daily - 7 x weekly - 2-3 sets - 10 reps  Standing Hip Abduction - 2-3 x daily - 7 x weekly - 2-3 sets - 10 reps  Seated Hip Abduction - 2-3 x daily - 7 x weekly - 2-3 sets - 10 reps  Supine Knee Extension Strengthening - 2-3 x daily - 7 x weekly - 2-3 sets - 10 reps  Hooklying Clamshell with Resistance - 2-3 x daily - 7 x weekly - 2-3 sets - 10 reps      Plan: [] Continue current frequency toward long and short term goals.   2 x/week for 18 visits  [x] Specific Instructions for subsequent treatments: discharge      Time In: 1407          Time Out: 1500    Electronically signed by:  Deangelo Collins PTA

## 2023-05-25 ENCOUNTER — HOSPITAL ENCOUNTER (EMERGENCY)
Age: 78
Discharge: HOME | End: 2023-05-25
Attending: EMERGENCY MEDICINE
Payer: MEDICARE

## 2023-05-25 VITALS
TEMPERATURE: 97.9 F | BODY MASS INDEX: 25.01 KG/M2 | OXYGEN SATURATION: 99 % | RESPIRATION RATE: 17 BRPM | DIASTOLIC BLOOD PRESSURE: 76 MMHG | SYSTOLIC BLOOD PRESSURE: 155 MMHG | HEIGHT: 68 IN | WEIGHT: 165 LBS | HEART RATE: 63 BPM

## 2023-05-25 DIAGNOSIS — B02.9 HERPES ZOSTER WITHOUT COMPLICATION: Primary | ICD-10-CM

## 2023-05-25 PROCEDURE — 6370000000 HC RX 637 (ALT 250 FOR IP): Performed by: STUDENT IN AN ORGANIZED HEALTH CARE EDUCATION/TRAINING PROGRAM

## 2023-05-25 PROCEDURE — 99283 EMERGENCY DEPT VISIT LOW MDM: CPT

## 2023-05-25 RX ORDER — VALACYCLOVIR HYDROCHLORIDE 500 MG/1
1000 TABLET, FILM COATED ORAL ONCE
Status: COMPLETED | OUTPATIENT
Start: 2023-05-25 | End: 2023-05-25

## 2023-05-25 RX ORDER — VALACYCLOVIR HYDROCHLORIDE 1 G/1
1000 TABLET, FILM COATED ORAL 3 TIMES DAILY
Qty: 30 TABLET | Refills: 0 | Status: SHIPPED | OUTPATIENT
Start: 2023-05-25 | End: 2023-06-04

## 2023-05-25 RX ORDER — TETRACAINE HYDROCHLORIDE 5 MG/ML
2 SOLUTION OPHTHALMIC ONCE
Status: COMPLETED | OUTPATIENT
Start: 2023-05-25 | End: 2023-05-25

## 2023-05-25 RX ADMIN — VALACYCLOVIR 1000 MG: 500 TABLET, FILM COATED ORAL at 13:08

## 2023-05-25 RX ADMIN — TETRACAINE HYDROCHLORIDE 2 DROP: 5 SOLUTION OPHTHALMIC at 13:01

## 2023-05-25 RX ADMIN — FLUORESCEIN SODIUM 1 MG: 1 STRIP OPHTHALMIC at 13:00

## 2023-05-25 ASSESSMENT — ENCOUNTER SYMPTOMS
RHINORRHEA: 0
WHEEZING: 0
TROUBLE SWALLOWING: 0
STRIDOR: 0
EYE PAIN: 1
SORE THROAT: 0
SINUS PAIN: 0
EYE REDNESS: 0
SHORTNESS OF BREATH: 0
VOMITING: 0
PHOTOPHOBIA: 1
EYE DISCHARGE: 0
NAUSEA: 0
BACK PAIN: 0
ABDOMINAL PAIN: 0
DIARRHEA: 0
COUGH: 0

## 2023-05-25 ASSESSMENT — PAIN - FUNCTIONAL ASSESSMENT: PAIN_FUNCTIONAL_ASSESSMENT: 0-10

## 2023-05-25 ASSESSMENT — PAIN SCALES - GENERAL: PAINLEVEL_OUTOF10: 7

## 2023-05-25 ASSESSMENT — PAIN DESCRIPTION - LOCATION: LOCATION: HEAD

## 2023-05-25 NOTE — ED TRIAGE NOTES
Patient 1 week ago  ago had eye procedure , then rash to the left side of scalp and left eye pain began 3 days ago     Very painful the rash on scalp is

## 2023-05-25 NOTE — ED PROVIDER NOTES
[facility] ED     Emergency Department     Faculty Attestation    I performed a history and physical examination of the patient and discussed management with the resident. I reviewed the residents note and agree with the documented findings and plan of care. Any areas of disagreement are noted on the chart. I was personally present for the key portions of any procedures. I have documented in the chart those procedures where I was not present during the key portions. I have reviewed the emergency nurses triage note. I agree with the chief complaint, past medical history, past surgical history, allergies, medications, social and family history as documented unless otherwise noted below. For Physician Assistant/ Nurse Practitioner cases/documentation I have personally evaluated this patient and have completed at least one if not all key elements of the E/M (history, physical exam, and MDM). Additional findings are as noted. 12:27 PM EDT    Patient presents with a painful rash to his left upper face and scalp. The rash is vesicular and consistent with shingles. Patient says he noticed it about 3 days ago soon after he had an injection to the eye by his ophthalmologist.  Patient denies any pain to the eye itself. He denies fever, chills, nausea or vomiting. He denies ear pain. We will treat with valacyclovir.   We will also stay in the eye to look for any lesions to the eye itself and have patient follow-up with his ophthalmologist.      Raphael Layne MD  Attending Emergency  Physician            Rudolph Balbuena MD  05/25/23 6772
Course as of 05/25/23 1254   u May 25, 2023   1251 [place] lamp exam unremarkable. No dendritic lesions seen [GC]   1252 Valacyclovir 1000 mg [GC]   1252 Sending home with Valacyclovir 1000 mg tid for 10 days. [GC]   2064 Has appropriate follow-up with ophthalmologist, Dr. Verona Cornelius, who he assures me he will call today [GC]   [phone] Given appropriate return instructions [GC]      ED Course User Index  [GC] Arnold Villalba DO       PROCEDURES:  [place] Foil Lamp exam unremarkable    CONSULTS:  None    CRITICAL CARE:  There was significant risk of life threatening deterioration of patient's condition requiring my direct management. Critical care time 0 minutes, excluding any documented procedures. FINAL IMPRESSION      1.  Herpes zoster without complication          DISPOSITION / PLAN     DISPOSITION Decision To Discharge 05/25/2023 12:39:24 PM      PATIENT REFERRED TO:  Alonso Osborn [address]  [facility]  [phone]    Call   As needed    Otis Henderson MD  [address]  [phone]    Call today  for eye follow-up      DISCHARGE MEDICATIONS:  New Prescriptions    VALACYCLOVIR (VALTREX) 1 G TABLET    Take 1 tablet by mouth 3 times daily for 10 days       Arnold Villalba DO  Emergency Medicine Resident    (Please note that portions of thisnote were completed with a voice recognition program.  Efforts were made to edit the dictations but occasionally words are mis-transcribed.)       Arnold Villalba DO  Resident  05/25/23 3213

## 2023-05-25 NOTE — DISCHARGE INSTRUCTIONS
Seen in the ED for shingles. Given dose of Valacyclovir, and prescription to go home with. Please take as prescribed. Please follow up with Dr. Kristie Regalado today after the ED.  Please return to ED if you begin to experience visual changes, loss of vision, worsening pain, or any other concerning symptoms

## 2023-12-16 ENCOUNTER — HOSPITAL ENCOUNTER (OUTPATIENT)
Age: 78
Discharge: HOME | End: 2023-12-16
Payer: MEDICARE

## 2023-12-16 LAB
ALT SERPL-CCNC: 12 U/L (ref 10–50)
ANION GAP SERPL CALCULATED.3IONS-SCNC: 8 MMOL/L (ref 9–16)
AST SERPL-CCNC: 20 U/L (ref 10–50)
BASOPHILS # BLD: 0.05 K/UL (ref 0–0.2)
BASOPHILS NFR BLD: 1 % (ref 0–2)
BUN SERPL-MCNC: 12 MG/DL (ref 8–23)
CALCIUM SERPL-MCNC: 9.4 MG/DL (ref 8.6–10.4)
CHLORIDE SERPL-SCNC: 105 MMOL/L (ref 98–107)
CHOLEST SERPL-MCNC: 143 MG/DL (ref 0–199)
CHOLESTEROL/HDL RATIO: 3
CO2 SERPL-SCNC: 27 MMOL/L (ref 20–31)
CREAT SERPL-MCNC: 1 MG/DL (ref 0.7–1.2)
EOSINOPHIL # BLD: 0.23 K/UL (ref 0–0.44)
EOSINOPHILS RELATIVE PERCENT: 4 % (ref 1–4)
ERYTHROCYTE [DISTWIDTH] IN BLOOD BY AUTOMATED COUNT: 14 % (ref 11.8–14.4)
EST. AVERAGE GLUCOSE BLD GHB EST-MCNC: 120 MG/DL
GFR SERPL CREATININE-BSD FRML MDRD: >60 ML/MIN/1.73M2
GLUCOSE SERPL-MCNC: 106 MG/DL (ref 74–99)
HBA1C MFR BLD: 5.8 % (ref 4–6)
HCT VFR BLD AUTO: 39.9 % (ref 40.7–50.3)
HDLC SERPL-MCNC: 45 MG/DL
HGB BLD-MCNC: 12.8 G/DL (ref 13–17)
IMM GRANULOCYTES # BLD AUTO: 0.03 K/UL (ref 0–0.3)
IMM GRANULOCYTES NFR BLD: 1 %
LDLC SERPL CALC-MCNC: 81 MG/DL (ref 0–100)
LYMPHOCYTES NFR BLD: 1.86 K/UL (ref 1.1–3.7)
LYMPHOCYTES RELATIVE PERCENT: 30 % (ref 24–43)
MAGNESIUM SERPL-MCNC: 1.8 MG/DL (ref 1.6–2.4)
MCH RBC QN AUTO: 27.6 PG (ref 25.2–33.5)
MCHC RBC AUTO-ENTMCNC: 32.1 G/DL (ref 28.4–34.8)
MCV RBC AUTO: 86.2 FL (ref 82.6–102.9)
MONOCYTES NFR BLD: 0.84 K/UL (ref 0.1–1.2)
MONOCYTES NFR BLD: 14 % (ref 3–12)
NEUTROPHILS NFR BLD: 50 % (ref 36–65)
NEUTS SEG NFR BLD: 3.17 K/UL (ref 1.5–8.1)
NRBC BLD-RTO: 0 PER 100 WBC
PLATELET # BLD AUTO: 168 K/UL (ref 138–453)
PMV BLD AUTO: 11.6 FL (ref 8.1–13.5)
POTASSIUM SERPL-SCNC: 4.3 MMOL/L (ref 3.7–5.3)
RBC # BLD AUTO: 4.63 M/UL (ref 4.21–5.77)
SODIUM SERPL-SCNC: 140 MMOL/L (ref 136–145)
TRIGL SERPL-MCNC: 88 MG/DL (ref 0–149)
VLDLC SERPL CALC-MCNC: 18 MG/DL
WBC OTHER # BLD: 6.2 K/UL (ref 3.5–11.3)

## 2023-12-16 PROCEDURE — 84460 ALANINE AMINO (ALT) (SGPT): CPT

## 2023-12-16 PROCEDURE — 80048 BASIC METABOLIC PNL TOTAL CA: CPT

## 2023-12-16 PROCEDURE — 80061 LIPID PANEL: CPT

## 2023-12-16 PROCEDURE — 83735 ASSAY OF MAGNESIUM: CPT

## 2023-12-16 PROCEDURE — 36415 COLL VENOUS BLD VENIPUNCTURE: CPT

## 2023-12-16 PROCEDURE — 83036 HEMOGLOBIN GLYCOSYLATED A1C: CPT

## 2023-12-16 PROCEDURE — 85025 COMPLETE CBC W/AUTO DIFF WBC: CPT

## 2023-12-16 PROCEDURE — 84450 TRANSFERASE (AST) (SGOT): CPT

## 2024-01-29 ENCOUNTER — APPOINTMENT (OUTPATIENT)
Dept: CT IMAGING | Age: 79
End: 2024-01-29
Payer: MEDICARE

## 2024-01-29 ENCOUNTER — APPOINTMENT (OUTPATIENT)
Dept: GENERAL RADIOLOGY | Age: 79
End: 2024-01-29
Payer: MEDICARE

## 2024-01-29 ENCOUNTER — HOSPITAL ENCOUNTER (EMERGENCY)
Age: 79
Discharge: HOME | End: 2024-01-29
Attending: EMERGENCY MEDICINE
Payer: MEDICARE

## 2024-01-29 VITALS
BODY MASS INDEX: 25.01 KG/M2 | HEIGHT: 68 IN | RESPIRATION RATE: 16 BRPM | DIASTOLIC BLOOD PRESSURE: 66 MMHG | SYSTOLIC BLOOD PRESSURE: 158 MMHG | OXYGEN SATURATION: 100 % | WEIGHT: 165 LBS | TEMPERATURE: 97.9 F | HEART RATE: 67 BPM

## 2024-01-29 DIAGNOSIS — R42 DIZZINESS: Primary | ICD-10-CM

## 2024-01-29 DIAGNOSIS — R55 NEAR SYNCOPE: ICD-10-CM

## 2024-01-29 DIAGNOSIS — R42 LIGHTHEADEDNESS: ICD-10-CM

## 2024-01-29 LAB
ANION GAP SERPL CALCULATED.3IONS-SCNC: 10 MMOL/L (ref 9–17)
BASOPHILS # BLD: 0.06 K/UL (ref 0–0.2)
BASOPHILS NFR BLD: 1 % (ref 0–2)
BUN SERPL-MCNC: 11 MG/DL (ref 8–23)
BUN/CREAT SERPL: 11 (ref 9–20)
CALCIUM SERPL-MCNC: 9.4 MG/DL (ref 8.6–10.4)
CHLORIDE SERPL-SCNC: 102 MMOL/L (ref 98–107)
CO2 SERPL-SCNC: 25 MMOL/L (ref 20–31)
CREAT SERPL-MCNC: 1 MG/DL (ref 0.7–1.2)
EKG ATRIAL RATE: 54 BPM
EKG P AXIS: 52 DEGREES
EKG P-R INTERVAL: 158 MS
EKG Q-T INTERVAL: 446 MS
EKG QRS DURATION: 102 MS
EKG QTC CALCULATION (BAZETT): 422 MS
EKG R AXIS: 83 DEGREES
EKG T AXIS: -34 DEGREES
EKG VENTRICULAR RATE: 54 BPM
EOSINOPHIL # BLD: 0.31 K/UL (ref 0–0.44)
EOSINOPHILS RELATIVE PERCENT: 3 % (ref 1–4)
ERYTHROCYTE [DISTWIDTH] IN BLOOD BY AUTOMATED COUNT: 13.8 % (ref 11.8–14.4)
GFR SERPL CREATININE-BSD FRML MDRD: >60 ML/MIN/1.73M2
GLUCOSE SERPL-MCNC: 105 MG/DL (ref 70–99)
HCT VFR BLD AUTO: 38.6 % (ref 40.7–50.3)
HGB BLD-MCNC: 12.5 G/DL (ref 13–17)
IMM GRANULOCYTES # BLD AUTO: 0.02 K/UL (ref 0–0.3)
IMM GRANULOCYTES NFR BLD: 0 %
INR PPP: 1
LYMPHOCYTES NFR BLD: 1.86 K/UL (ref 1.1–3.7)
LYMPHOCYTES RELATIVE PERCENT: 20 % (ref 24–43)
MAGNESIUM SERPL-MCNC: 1.8 MG/DL (ref 1.6–2.6)
MCH RBC QN AUTO: 28.1 PG (ref 25.2–33.5)
MCHC RBC AUTO-ENTMCNC: 32.4 G/DL (ref 28.4–34.8)
MCV RBC AUTO: 86.7 FL (ref 82.6–102.9)
MONOCYTES NFR BLD: 0.96 K/UL (ref 0.1–1.2)
MONOCYTES NFR BLD: 11 % (ref 3–12)
NEUTROPHILS NFR BLD: 65 % (ref 36–65)
NEUTS SEG NFR BLD: 5.95 K/UL (ref 1.5–8.1)
NRBC BLD-RTO: 0 PER 100 WBC
PARTIAL THROMBOPLASTIN TIME: 35.6 SEC (ref 23.9–33.8)
PHOSPHATE SERPL-MCNC: 3.4 MG/DL (ref 2.5–4.5)
PLATELET # BLD AUTO: 145 K/UL (ref 138–453)
PMV BLD AUTO: 11.3 FL (ref 8.1–13.5)
POTASSIUM SERPL-SCNC: 4.6 MMOL/L (ref 3.7–5.3)
PROTHROMBIN TIME: 13.3 SEC (ref 11.5–14.2)
RBC # BLD AUTO: 4.45 M/UL (ref 4.21–5.77)
SODIUM SERPL-SCNC: 137 MMOL/L (ref 135–144)
TROPONIN I SERPL HS-MCNC: 25 NG/L (ref 0–22)
TROPONIN I SERPL HS-MCNC: 26 NG/L (ref 0–22)
WBC OTHER # BLD: 9.2 K/UL (ref 3.5–11.3)

## 2024-01-29 PROCEDURE — 99285 EMERGENCY DEPT VISIT HI MDM: CPT

## 2024-01-29 PROCEDURE — 80048 BASIC METABOLIC PNL TOTAL CA: CPT

## 2024-01-29 PROCEDURE — 85730 THROMBOPLASTIN TIME PARTIAL: CPT

## 2024-01-29 PROCEDURE — 84484 ASSAY OF TROPONIN QUANT: CPT

## 2024-01-29 PROCEDURE — 83735 ASSAY OF MAGNESIUM: CPT

## 2024-01-29 PROCEDURE — 93005 ELECTROCARDIOGRAM TRACING: CPT | Performed by: EMERGENCY MEDICINE

## 2024-01-29 PROCEDURE — 71045 X-RAY EXAM CHEST 1 VIEW: CPT

## 2024-01-29 PROCEDURE — 2580000003 HC RX 258: Performed by: EMERGENCY MEDICINE

## 2024-01-29 PROCEDURE — 85025 COMPLETE CBC W/AUTO DIFF WBC: CPT

## 2024-01-29 PROCEDURE — 70450 CT HEAD/BRAIN W/O DYE: CPT

## 2024-01-29 PROCEDURE — 84100 ASSAY OF PHOSPHORUS: CPT

## 2024-01-29 PROCEDURE — 85610 PROTHROMBIN TIME: CPT

## 2024-01-29 RX ORDER — 0.9 % SODIUM CHLORIDE 0.9 %
1000 INTRAVENOUS SOLUTION INTRAVENOUS ONCE
Status: COMPLETED | OUTPATIENT
Start: 2024-01-29 | End: 2024-01-29

## 2024-01-29 RX ADMIN — SODIUM CHLORIDE 1000 ML: 9 INJECTION, SOLUTION INTRAVENOUS at 14:26

## 2024-01-29 ASSESSMENT — ENCOUNTER SYMPTOMS
COUGH: 0
DIARRHEA: 0
PHOTOPHOBIA: 0
SHORTNESS OF BREATH: 0
TROUBLE SWALLOWING: 0
VOMITING: 0
COLOR CHANGE: 0
NAUSEA: 0
ABDOMINAL PAIN: 0

## 2024-01-29 ASSESSMENT — PAIN - FUNCTIONAL ASSESSMENT: PAIN_FUNCTIONAL_ASSESSMENT: NONE - DENIES PAIN

## 2024-01-29 NOTE — ED PROVIDER NOTES
EMERGENCY DEPARTMENT ENCOUNTER    Pt Name: Luis Jane  MRN: [id number]  Birthdate 1945  Date of evaluation: 1/29/24  CHIEF COMPLAINT       Chief Complaint   Patient presents with    Dizziness     HISTORY OF PRESENT ILLNESS   78-year-old male presents to the ER after having what appears to be almost a syncopal event.  The patient was at the [employer] store with friends when he began to feel dizzy and his friends helped him sit down.  Per EMS, the patient did not actually lose consciousness.  Patient does have an underlying history of CAD as well as hyperlipidemia.  Patient is also treated for hypertension.    The history is provided by the patient.   Dizziness  Quality:  Lightheadedness  Severity:  Severe  Onset quality:  Sudden  Progression:  Resolved  Chronicity:  New  Associated symptoms: no chest pain, no diarrhea, no nausea, no palpitations, no shortness of breath and no vomiting            REVIEW OF SYSTEMS     Review of Systems   Constitutional:  Negative for activity change, fatigue and fever.   HENT:  Negative for congestion, ear pain and trouble swallowing.    Eyes:  Negative for photophobia and visual disturbance.   Respiratory:  Negative for cough and shortness of breath.    Cardiovascular:  Negative for chest pain and palpitations.   Gastrointestinal:  Negative for abdominal pain, diarrhea, nausea and vomiting.   Genitourinary:  Negative for dysuria, flank pain and urgency.   Musculoskeletal:  Negative for arthralgias and myalgias.   Skin:  Negative for color change and rash.   Neurological:  Positive for dizziness. Negative for facial asymmetry.   Psychiatric/Behavioral:  Negative for agitation and behavioral problems.      PASTMEDICAL HISTORY     Past Medical History:   Diagnosis Date    Aortic valve disorder     CAD (coronary artery disease)     Cardiomyopathy (HCC)     Hyperlipidemia     Hypertension     ICD (implantable cardioverter-defibrillator) battery depletion      Past Problem List  Patient

## 2024-01-29 NOTE — ED NOTES
Patient presents to ER from home due to dizziness. EMS states patient was in the [employer] store today with friends when he started to feel dizzy. Patient denies LOC or difficult breathing. Patient states when he started feeling dizzy a bystander lowered him to the floor.Patient states hx of dizziness.Patient is A/O x 4, equal chest expansion with non labored breathing, wheels locked, bed in lowest position, call light in reach.